# Patient Record
Sex: MALE | Race: WHITE | HISPANIC OR LATINO | Employment: FULL TIME | URBAN - METROPOLITAN AREA
[De-identification: names, ages, dates, MRNs, and addresses within clinical notes are randomized per-mention and may not be internally consistent; named-entity substitution may affect disease eponyms.]

---

## 2017-02-05 ENCOUNTER — HOSPITAL ENCOUNTER (EMERGENCY)
Facility: CLINIC | Age: 52
Discharge: HOME | End: 2017-02-05
Attending: EMERGENCY MEDICINE | Admitting: EMERGENCY MEDICINE

## 2017-02-05 VITALS
RESPIRATION RATE: 18 BRPM | SYSTOLIC BLOOD PRESSURE: 104 MMHG | DIASTOLIC BLOOD PRESSURE: 70 MMHG | WEIGHT: 206 LBS | HEART RATE: 142 BPM | TEMPERATURE: 98.2 F | OXYGEN SATURATION: 98 %

## 2017-02-05 DIAGNOSIS — I48.0 PAROXYSMAL ATRIAL FIBRILLATION (H): ICD-10-CM

## 2017-02-05 LAB
ANION GAP SERPL CALCULATED.3IONS-SCNC: 7 MMOL/L (ref 3–14)
BASOPHILS # BLD AUTO: 0.1 10E9/L (ref 0–0.2)
BASOPHILS NFR BLD AUTO: 0.6 %
BUN SERPL-MCNC: 15 MG/DL (ref 7–30)
CALCIUM SERPL-MCNC: 8.9 MG/DL (ref 8.5–10.1)
CHLORIDE SERPL-SCNC: 107 MMOL/L (ref 94–109)
CO2 SERPL-SCNC: 26 MMOL/L (ref 20–32)
CREAT SERPL-MCNC: 0.83 MG/DL (ref 0.66–1.25)
D DIMER PPP FEU-MCNC: 0.3 UG/ML FEU (ref 0–0.5)
DIFFERENTIAL METHOD BLD: NORMAL
EOSINOPHIL # BLD AUTO: 0.3 10E9/L (ref 0–0.7)
EOSINOPHIL NFR BLD AUTO: 3.7 %
ERYTHROCYTE [DISTWIDTH] IN BLOOD BY AUTOMATED COUNT: 12.4 % (ref 10–15)
GFR SERPL CREATININE-BSD FRML MDRD: ABNORMAL ML/MIN/1.7M2
GLUCOSE SERPL-MCNC: 147 MG/DL (ref 70–99)
HCT VFR BLD AUTO: 48.8 % (ref 40–53)
HGB BLD-MCNC: 16 G/DL (ref 13.3–17.7)
IMM GRANULOCYTES # BLD: 0 10E9/L (ref 0–0.4)
IMM GRANULOCYTES NFR BLD: 0.2 %
INTERPRETATION ECG - MUSE: NORMAL
INTERPRETATION ECG - MUSE: NORMAL
LYMPHOCYTES # BLD AUTO: 3.7 10E9/L (ref 0.8–5.3)
LYMPHOCYTES NFR BLD AUTO: 42.3 %
MCH RBC QN AUTO: 29.6 PG (ref 26.5–33)
MCHC RBC AUTO-ENTMCNC: 32.8 G/DL (ref 31.5–36.5)
MCV RBC AUTO: 90 FL (ref 78–100)
MONOCYTES # BLD AUTO: 0.8 10E9/L (ref 0–1.3)
MONOCYTES NFR BLD AUTO: 9.1 %
NEUTROPHILS # BLD AUTO: 3.9 10E9/L (ref 1.6–8.3)
NEUTROPHILS NFR BLD AUTO: 44.1 %
NRBC # BLD AUTO: 0 10*3/UL
NRBC BLD AUTO-RTO: 0 /100
NT-PROBNP SERPL-MCNC: 101 PG/ML (ref 0–900)
PLATELET # BLD AUTO: 274 10E9/L (ref 150–450)
POTASSIUM SERPL-SCNC: 3.8 MMOL/L (ref 3.4–5.3)
RBC # BLD AUTO: 5.41 10E12/L (ref 4.4–5.9)
SODIUM SERPL-SCNC: 140 MMOL/L (ref 133–144)
T4 FREE SERPL-MCNC: 0.88 NG/DL (ref 0.76–1.46)
TROPONIN I SERPL-MCNC: 0.02 UG/L (ref 0–0.04)
TSH SERPL DL<=0.005 MIU/L-ACNC: 4.3 MU/L (ref 0.4–4)
WBC # BLD AUTO: 8.8 10E9/L (ref 4–11)

## 2017-02-05 PROCEDURE — 80048 BASIC METABOLIC PNL TOTAL CA: CPT | Performed by: EMERGENCY MEDICINE

## 2017-02-05 PROCEDURE — 40000275 ZZH STATISTIC RCP TIME EA 10 MIN

## 2017-02-05 PROCEDURE — 25000125 ZZHC RX 250: Performed by: EMERGENCY MEDICINE

## 2017-02-05 PROCEDURE — 84443 ASSAY THYROID STIM HORMONE: CPT | Performed by: EMERGENCY MEDICINE

## 2017-02-05 PROCEDURE — 85379 FIBRIN DEGRADATION QUANT: CPT | Performed by: EMERGENCY MEDICINE

## 2017-02-05 PROCEDURE — 92960 CARDIOVERSION ELECTRIC EXT: CPT

## 2017-02-05 PROCEDURE — 84484 ASSAY OF TROPONIN QUANT: CPT | Performed by: EMERGENCY MEDICINE

## 2017-02-05 PROCEDURE — 99285 EMERGENCY DEPT VISIT HI MDM: CPT | Mod: 25

## 2017-02-05 PROCEDURE — 83880 ASSAY OF NATRIURETIC PEPTIDE: CPT | Performed by: EMERGENCY MEDICINE

## 2017-02-05 PROCEDURE — 93005 ELECTROCARDIOGRAM TRACING: CPT

## 2017-02-05 PROCEDURE — 85025 COMPLETE CBC W/AUTO DIFF WBC: CPT | Performed by: EMERGENCY MEDICINE

## 2017-02-05 PROCEDURE — 84439 ASSAY OF FREE THYROXINE: CPT | Performed by: EMERGENCY MEDICINE

## 2017-02-05 RX ORDER — DILTIAZEM HYDROCHLORIDE 5 MG/ML
20 INJECTION INTRAVENOUS ONCE
Status: COMPLETED | OUTPATIENT
Start: 2017-02-05 | End: 2017-02-05

## 2017-02-05 RX ORDER — LIDOCAINE 40 MG/G
CREAM TOPICAL
Status: DISCONTINUED | OUTPATIENT
Start: 2017-02-05 | End: 2017-02-05 | Stop reason: HOSPADM

## 2017-02-05 RX ORDER — PROPOFOL 10 MG/ML
200 INJECTION, EMULSION INTRAVENOUS ONCE
Status: COMPLETED | OUTPATIENT
Start: 2017-02-05 | End: 2017-02-05

## 2017-02-05 RX ADMIN — PROPOFOL 80 MG: 10 INJECTION, EMULSION INTRAVENOUS at 05:23

## 2017-02-05 RX ADMIN — DILTIAZEM HYDROCHLORIDE 20 MG: 5 INJECTION INTRAVENOUS at 03:46

## 2017-02-05 ASSESSMENT — ENCOUNTER SYMPTOMS
PALPITATIONS: 1
VOMITING: 0
SHORTNESS OF BREATH: 1
FEVER: 0
DIARRHEA: 0

## 2017-02-05 NOTE — ED NOTES
Pt awoke 30 minutes prior and felt a discomfort in his chest as well a sensation of his heart beating rapidly.

## 2017-02-05 NOTE — ED PROVIDER NOTES
History     Chief Complaint:  Palpitations    HPI   Jordan Warner is a 51 year old male who presents with his wife, who is acting as   He was feeling well when he went to bed, he woke up suddenly with rapid palpitations. He got up, drank some milk and sat in the couch because he was also feeling light-headed. He denies chest pain but complains of mild difficulty breathing. His wife reports that he had a similar situation happened in December but he didn't consult at the time. They deny alcohol or drug use. Denies vomiting, diarrhea, or fever.     Allergies:  No Known Allergies     Medications:    The patient is not currently taking any prescribed medications.    Past Medical History:    History reviewed. No pertinent past medical history.    Past Surgical History:    History reviewed. No pertinent past surgical history.    Family History:    No family history on file.    Social History:  Marital Status:       Review of Systems   Constitutional: Negative for fever.   Respiratory: Positive for shortness of breath.    Cardiovascular: Positive for palpitations. Negative for chest pain.   Gastrointestinal: Negative for vomiting and diarrhea.   All other systems reviewed and are negative.      Physical Exam     Patient Vitals for the past 24 hrs:   BP Temp Temp src Pulse Heart Rate Resp SpO2 Weight   02/05/17 0545 104/70 mmHg - - - 47 - 98 % -   02/05/17 0530 109/73 mmHg - - - 49 - 97 % -   02/05/17 0515 - - - - 49 - 97 % -   02/05/17 0500 134/86 mmHg - - - 81 - 99 % -   02/05/17 0445 133/86 mmHg - - - 93 - 99 % -   02/05/17 0415 101/73 mmHg - - - 68 - 96 % -   02/05/17 0400 109/80 mmHg - - - 84 - 95 % -   02/05/17 0345 (!) 134/115 mmHg - - - 112 - 96 % -   02/05/17 0330 (!) 135/98 mmHg - - - 140 - 96 % -   02/05/17 0320 - - - - 117 - 97 % -   02/05/17 0315 (!) 127/93 mmHg - - - - - 96 % -   02/05/17 0314 (!) 127/93 mmHg 98.2  F (36.8  C) Oral 142 142 18 92 % 93.441 kg (206 lb)       Physical Exam  Nursing note  and vitals reviewed.  Constitutional: Cooperative.   HENT:   Mouth/Throat: Mucous membranes are normal.   Cardiovascular: Tachycardic, irregularly irregular rhythm. Normal heart sounds.  No murmur.  Pulmonary/Chest: Effort normal and breath sounds normal. No respiratory distress. No wheezes. No rales.   Abdominal: Soft. Normal appearance. There is no tenderness. There is no rigidity and no guarding.   Musculoskeletal: No leg edema.  Neurological: Alert. Oriented x4.  Skin: Skin is warm and dry. No rash noted.   Psychiatric: Normal mood and affect.     Emergency Department Course     ECG (03:13:54):  Indication: palpitations  Rate 126 bpm. NH interval - ms. QRS duration 88 ms. QT/QTc 320/463 ms. R axis -16.   Interpretation: AFib with RVR. Nonspecific ST abnormality.  Agree with computer interpretation.  Interpreted at 0315 by Dr. Landry.     ECG (04:11:45):  Indication: post diltiazem   Rate 82 bpm. NH interval - ms. QRS duration 94 ms. QT/QTc 356/415 ms. R axis -9.   Interpretation: AFib.  Agree with computer interpretation.    Interpreted at 0438 by Dr. Landry.     ECG (05:16:54):  Indication: post-electrical cardioversion.   Rate 52 bpm. NH interval 148 ms. QRS duration 92 ms. QT/QTc 408/379 ms. R axis -11.   Interpretation: sinus bradycardia.  Agree with computer interpretation.   Interpreted at 0524 by Dr. Landry.     Laboratory:  Laboratory findings were communicated with the patient who voiced understanding of the findings.  CBC: WBC 8.8, HGB 16.0, , WNL  BMP:  (Creatinine 0.83)    D-dimer: <0.3    Troponin I: 0.023  BNP: 101    TSH: 4.30  T4 free: 0.88    Procedures:  Electrical Cardioversion:  Indication: Atrial fibrillation with RVR  Risks, benefits and alternatives were discussed with patient and consent for procedure was obtained.    Otho protocol was followed. TIME OUT conducted just prior to starting procedure confirmed patient identity, site/side, procedure, patient position, and  availability of correct equipment.     Procedural sedation: propofol 80 mg  Quick combo electrodes were placed.  Trial 1: Synchronized shock at 120 Joules was unsuccessful.   Trial 2: Synchronized shock at 200 Joules was successful.     Patient Status:  Patient tolerated the procedure well. There were no complications).    Interventions:  0346: Diltiazem 20 mg, IV  0523: Propofol 80 mg, IV     Emergency Department Course:  Nursing notes and vitals reviewed.  I performed an exam of the patient as documented above.   The patient was placed on continuous pulse oximetry and cardiac monitoring.  A peripheral IV was established.  IV was inserted and blood was drawn for laboratory testing, results above.    I discussed the findings and treatment plan with the patient. They expressed understanding of this plan and consented to discharge. They will be discharged home with instructions for care and follow up. In addition, the patient will return to the emergency department if their symptoms persist, worsen, if new symptoms arise or if there is any concern.  All questions were answered.   I personally reviewed the findings with the patient and answered all related questions prior to discharge.    Impression & Plan      Medical Decision Making:  Jordan Warner is a 51 year old gentleman who presents with palpitations. He is found to be in new onset Atrial Fibrillation with RVR. He was rate controlled successfully using diltiazem but never converted. He has a very clear timing of onset as he came in essentially immediately after feeling the palpitations. He was safe for cardioversion and was cardioverted on the 2nd attempt without difficulty. His thyroid is reassuring as is his troponin, D-Dimer is negative making PE very unlikely in this otherwise healthy gentleman. He has clear lungs and no concern for pericarditis, myocarditis or pneumothorax. I will have him follow-up with cardiology this week to discuss further workup and  possible medications to use if his symptoms recur. Return precautions discussed should he develop any more palpitations in the meantime.     Diagnosis:    ICD-10-CM   1. Paroxysmal atrial fibrillation (H) I48.0     Disposition:   Discharge home    Discharge Medications:  There are no discharge medications for this patient.      Scribe Disclosure:  Rebecca IVY, am serving as a scribe at 3:24 AM on 2/5/2017 to document services personally performed by Eliseo Landry MD, based on my observations and the provider's statements to me.        Eliseo Landry MD  02/05/17 2268

## 2017-02-05 NOTE — ED AVS SNAPSHOT
[facility]    [address] [place] [address]    Phone:  [phone]    Fax:  [phone]                                       Jordan Warner   MRN: [id number]    Department:  [facility]   Date of Visit:  2/5/2017           Patient Information     Date Of Birth          1965        Your diagnoses for this visit were:     Paroxysmal atrial fibrillation (H)        You were seen by Eliseo Landry MD.      Follow-up Information     Follow up with Cardiology this week.      Discharge References/Attachments     FIBRILLATION, ATRIAL (ENGLISH)      24 Hour Appointment Hotline       To make an appointment at any [place] clinic, call [phone] ([phone]). If you don't have a family doctor or clinic, we will help you find one. [place] clinics are conveniently located to serve the needs of you and your family.             Review of your medicines      Notice     You have not been prescribed any medications.            Procedures and tests performed during your visit     Procedure/Test Number of Times Performed    Basic metabolic panel 1    CBC with platelets differential 1    Cardiac Continuous Monitoring 1    D dimer quantitative 1    EKG 12 lead 4    Nt probnp inpatient (BNP) 1    Peripheral IV: Standard 1    Pulse oximetry nursing 1    T4 free 1    TSH with free T4 reflex 1    Troponin I 1      Orders Needing Specimen Collection     None      Pending Results     No orders found from 2/4/2017 to 2/6/2017.            Pending Culture Results     No orders found from 2/4/2017 to 2/6/2017.       Test Results from your hospital stay           2/5/2017  4:06 AM - Interface, Podio Results      Component Results     Component Value Ref Range & Units Status    WBC 8.8 4.0 - 11.0 10e9/L Final    RBC Count 5.41 4.4 - 5.9 10e12/L Final    Hemoglobin 16.0 13.3 - 17.7 g/dL Final    Hematocrit 48.8 40.0 - 53.0 % Final    MCV 90 78 -  100 fl Final    MCH 29.6 26.5 - 33.0 pg Final    MCHC 32.8 31.5 - 36.5 g/dL Final    RDW 12.4 10.0 - 15.0 % Final    Platelet Count 274 150 - 450 10e9/L Final    Diff Method Automated Method  Final    % Neutrophils 44.1 % Final    % Lymphocytes 42.3 % Final    % Monocytes 9.1 % Final    % Eosinophils 3.7 % Final    % Basophils 0.6 % Final    % Immature Granulocytes 0.2 % Final    Nucleated RBCs 0 0 /100 Final    Absolute Neutrophil 3.9 1.6 - 8.3 10e9/L Final    Absolute Lymphocytes 3.7 0.8 - 5.3 10e9/L Final    Absolute Monocytes 0.8 0.0 - 1.3 10e9/L Final    Absolute Eosinophils 0.3 0.0 - 0.7 10e9/L Final    Absolute Basophils 0.1 0.0 - 0.2 10e9/L Final    Abs Immature Granulocytes 0.0 0 - 0.4 10e9/L Final    Absolute Nucleated RBC 0.0  Final         2/5/2017  3:48 AM - Interface, Flexilab Results      Component Results     Component Value Ref Range & Units Status    D Dimer 0.3 0.0 - 0.50 ug/ml FEU Final    This D-dimer assay is intended for use in conjuntion with a clinical pretest   probability assessment model to exclude pulmonary embolism (PE) and as an aid   in the diagnosis of deep venous thrombosis (DVT) in outpatients suspected of   PE   or DVT. The cut-off value is 0.5 g/mL FEU.           2/5/2017  3:55 AM - Interface, Flexilab Results      Component Results     Component Value Ref Range & Units Status    Sodium 140 133 - 144 mmol/L Final    Potassium 3.8 3.4 - 5.3 mmol/L Final    Chloride 107 94 - 109 mmol/L Final    Carbon Dioxide 26 20 - 32 mmol/L Final    Anion Gap 7 3 - 14 mmol/L Final    Glucose 147 (H) 70 - 99 mg/dL Final    Urea Nitrogen 15 7 - 30 mg/dL Final    Creatinine 0.83 0.66 - 1.25 mg/dL Final    GFR Estimate >90  Non  GFR Calc   >60 mL/min/1.7m2 Final    GFR Estimate If Black >90   GFR Calc   >60 mL/min/1.7m2 Final    Calcium 8.9 8.5 - 10.1 mg/dL Final         2/5/2017  3:55 AM - Interface, Flexilab Results      Component Results     Component Value Ref  Range & Units Status    Troponin I ES 0.023 0.000 - 0.045 ug/L Final    The 99th percentile for upper reference range is 0.045 ug/L.  Troponin values   in   the range of 0.045 - 0.120 ug/L may be associated with risks of adverse   clinical events.           2/5/2017  3:55 AM - Interface, Flexilab Results      Component Results     Component Value Ref Range & Units Status    N-Terminal Pro BNP Inpatient 101 0 - 900 pg/mL Final    Reference range shown and results flagged as abnormal are suggested inpatient   cut points for confirming diagnosis if CHF in an acute setting. Establishing   a   baseline value for each individual patient is useful for follow-up. An   inpatient or emergency department NT-proPBNP <300 pg/mL effectively rules out   acute CHF, with 99% negative predictive value.  The outpatient non-acute reference range for ruling out CHF is:   0-125 pg/mL (age 18 to less than 75)   0-450 pg/mL (age 75 yrs and older)           2/5/2017  3:59 AM - Interface, Flexilab Results      Component Results     Component Value Ref Range & Units Status    TSH 4.30 (H) 0.40 - 4.00 mU/L Final         2/5/2017  4:13 AM - Interface, Flexilab Results      Component Results     Component Value Ref Range & Units Status    T4 Free 0.88 0.76 - 1.46 ng/dL Final                Clinical Quality Measure: Blood Pressure Screening     Your blood pressure was checked while you were in the emergency department today. The last reading we obtained was  BP: (!) 127/93 mmHg (Simultaneous filing. User may not have seen previous data.) . Please read the guidelines below about what these numbers mean and what you should do about them.  If your systolic blood pressure (the top number) is less than 120 and your diastolic blood pressure (the bottom number) is less than 80, then your blood pressure is normal. There is nothing more that you need to do about it.  If your systolic blood pressure (the top number) is 120-139 or your diastolic blood  "pressure (the bottom number) is 80-89, your blood pressure may be higher than it should be. You should have your blood pressure rechecked within a year by a primary care provider.  If your systolic blood pressure (the top number) is 140 or greater or your diastolic blood pressure (the bottom number) is 90 or greater, you may have high blood pressure. High blood pressure is treatable, but if left untreated over time it can put you at risk for heart attack, stroke, or kidney failure. You should have your blood pressure rechecked by a primary care provider within the next 4 weeks.  If your provider in the emergency department today gave you specific instructions to follow-up with your doctor or provider even sooner than that, you should follow that instruction and not wait for up to 4 weeks for your follow-up visit.        Thank you for choosing [facility]       Thank you for choosing [facility] for your care. Our goal is always to provide you with excellent care. Hearing back from our patients is one way we can continue to improve our services. Please take a few minutes to complete the written survey that you may receive in the mail after you visit with us. Thank you!        [employer] Information     [employer] lets you send messages to your doctor, view your test results, renew your prescriptions, schedule appointments and more. To sign up, go to [email or web address] . Click on \"Log in\" on the left side of the screen, which will take you to the Welcome page. Then click on \"Sign up Now\" on the right side of the page.     You will be asked to enter the access code listed below, as well as some personal information. Please follow the directions to create your username and password.     Your access code is: [email or web address]  Expires: 2017  5:46 AM     Your access code will  in 90 days. If you need help or a new code, please call your [facility] clinic or [phone].        Care EveryWhere ID     This is your Care " EveryWhere ID. This could be used by other organizations to access your [facility] medical records  [id number]        After Visit Summary       This is your record. Keep this with you and show to your community pharmacist(s) and doctor(s) at your next visit.

## 2017-02-06 LAB — INTERPRETATION ECG - MUSE: NORMAL

## 2017-02-13 ENCOUNTER — HOSPITAL ENCOUNTER (EMERGENCY)
Facility: CLINIC | Age: 52
Discharge: ANOTHER HOSPITAL | End: 2017-02-13
Attending: EMERGENCY MEDICINE | Admitting: EMERGENCY MEDICINE

## 2017-02-13 ENCOUNTER — HOSPITAL ENCOUNTER (OUTPATIENT)
Facility: CLINIC | Age: 52
Setting detail: OBSERVATION
Discharge: HOME | End: 2017-02-14
Attending: INTERNAL MEDICINE | Admitting: INTERNAL MEDICINE

## 2017-02-13 VITALS
DIASTOLIC BLOOD PRESSURE: 74 MMHG | OXYGEN SATURATION: 96 % | RESPIRATION RATE: 18 BRPM | HEART RATE: 157 BPM | SYSTOLIC BLOOD PRESSURE: 127 MMHG | TEMPERATURE: 98.2 F

## 2017-02-13 DIAGNOSIS — I48.91 ATRIAL FIBRILLATION WITH RVR (H): ICD-10-CM

## 2017-02-13 DIAGNOSIS — I48.91 ATRIAL FIBRILLATION WITH RAPID VENTRICULAR RESPONSE (H): Primary | ICD-10-CM

## 2017-02-13 LAB
ALBUMIN SERPL-MCNC: 3.8 G/DL (ref 3.4–5)
ALP SERPL-CCNC: 87 U/L (ref 40–150)
ALT SERPL W P-5'-P-CCNC: 43 U/L (ref 0–70)
ANION GAP SERPL CALCULATED.3IONS-SCNC: 9 MMOL/L (ref 3–14)
AST SERPL W P-5'-P-CCNC: 25 U/L (ref 0–45)
BASOPHILS # BLD AUTO: 0 10E9/L (ref 0–0.2)
BASOPHILS NFR BLD AUTO: 0.5 %
BILIRUB SERPL-MCNC: 0.8 MG/DL (ref 0.2–1.3)
BUN SERPL-MCNC: 13 MG/DL (ref 7–30)
CALCIUM SERPL-MCNC: 8.7 MG/DL (ref 8.5–10.1)
CHLORIDE SERPL-SCNC: 107 MMOL/L (ref 94–109)
CO2 SERPL-SCNC: 24 MMOL/L (ref 20–32)
CREAT SERPL-MCNC: 0.85 MG/DL (ref 0.66–1.25)
DIFFERENTIAL METHOD BLD: NORMAL
EOSINOPHIL # BLD AUTO: 0.2 10E9/L (ref 0–0.7)
EOSINOPHIL NFR BLD AUTO: 2.7 %
ERYTHROCYTE [DISTWIDTH] IN BLOOD BY AUTOMATED COUNT: 12.3 % (ref 10–15)
GFR SERPL CREATININE-BSD FRML MDRD: ABNORMAL ML/MIN/1.7M2
GLUCOSE SERPL-MCNC: 134 MG/DL (ref 70–99)
HCT VFR BLD AUTO: 48.1 % (ref 40–53)
HGB BLD-MCNC: 16 G/DL (ref 13.3–17.7)
IMM GRANULOCYTES # BLD: 0 10E9/L (ref 0–0.4)
IMM GRANULOCYTES NFR BLD: 0.2 %
INTERPRETATION ECG - MUSE: NORMAL
INTERPRETATION ECG - MUSE: NORMAL
LYMPHOCYTES # BLD AUTO: 3.1 10E9/L (ref 0.8–5.3)
LYMPHOCYTES NFR BLD AUTO: 37 %
MAGNESIUM SERPL-MCNC: 2.1 MG/DL (ref 1.6–2.3)
MCH RBC QN AUTO: 29.4 PG (ref 26.5–33)
MCHC RBC AUTO-ENTMCNC: 33.3 G/DL (ref 31.5–36.5)
MCV RBC AUTO: 88 FL (ref 78–100)
MONOCYTES # BLD AUTO: 0.6 10E9/L (ref 0–1.3)
MONOCYTES NFR BLD AUTO: 7.6 %
NEUTROPHILS # BLD AUTO: 4.4 10E9/L (ref 1.6–8.3)
NEUTROPHILS NFR BLD AUTO: 52 %
NRBC # BLD AUTO: 0 10*3/UL
NRBC BLD AUTO-RTO: 0 /100
PLATELET # BLD AUTO: 235 10E9/L (ref 150–450)
POTASSIUM SERPL-SCNC: 3.9 MMOL/L (ref 3.4–5.3)
PROT SERPL-MCNC: 7.6 G/DL (ref 6.8–8.8)
RBC # BLD AUTO: 5.45 10E12/L (ref 4.4–5.9)
SODIUM SERPL-SCNC: 140 MMOL/L (ref 133–144)
TROPONIN I SERPL-MCNC: 0.02 UG/L (ref 0–0.04)
TSH SERPL DL<=0.05 MIU/L-ACNC: 3.14 MU/L (ref 0.4–4)
WBC # BLD AUTO: 8.4 10E9/L (ref 4–11)

## 2017-02-13 PROCEDURE — 96376 TX/PRO/DX INJ SAME DRUG ADON: CPT

## 2017-02-13 PROCEDURE — 85025 COMPLETE CBC W/AUTO DIFF WBC: CPT | Performed by: EMERGENCY MEDICINE

## 2017-02-13 PROCEDURE — 84484 ASSAY OF TROPONIN QUANT: CPT | Performed by: PHYSICIAN ASSISTANT

## 2017-02-13 PROCEDURE — 25000125 ZZHC RX 250: Performed by: EMERGENCY MEDICINE

## 2017-02-13 PROCEDURE — 25000132 ZZH RX MED GY IP 250 OP 250 PS 637: Performed by: EMERGENCY MEDICINE

## 2017-02-13 PROCEDURE — 25000132 ZZH RX MED GY IP 250 OP 250 PS 637: Performed by: PHYSICIAN ASSISTANT

## 2017-02-13 PROCEDURE — 80053 COMPREHEN METABOLIC PANEL: CPT | Performed by: EMERGENCY MEDICINE

## 2017-02-13 PROCEDURE — 99285 EMERGENCY DEPT VISIT HI MDM: CPT | Mod: 25

## 2017-02-13 PROCEDURE — 99204 OFFICE O/P NEW MOD 45 MIN: CPT | Performed by: INTERNAL MEDICINE

## 2017-02-13 PROCEDURE — 99220 ZZC INITIAL OBSERVATION CARE,LEVL III: CPT | Performed by: PHYSICIAN ASSISTANT

## 2017-02-13 PROCEDURE — 93005 ELECTROCARDIOGRAM TRACING: CPT

## 2017-02-13 PROCEDURE — 84443 ASSAY THYROID STIM HORMONE: CPT | Performed by: EMERGENCY MEDICINE

## 2017-02-13 PROCEDURE — 84484 ASSAY OF TROPONIN QUANT: CPT | Performed by: EMERGENCY MEDICINE

## 2017-02-13 PROCEDURE — 96374 THER/PROPH/DIAG INJ IV PUSH: CPT

## 2017-02-13 PROCEDURE — 83735 ASSAY OF MAGNESIUM: CPT | Performed by: PHYSICIAN ASSISTANT

## 2017-02-13 PROCEDURE — 96361 HYDRATE IV INFUSION ADD-ON: CPT

## 2017-02-13 PROCEDURE — 25000128 H RX IP 250 OP 636: Performed by: EMERGENCY MEDICINE

## 2017-02-13 PROCEDURE — 36415 COLL VENOUS BLD VENIPUNCTURE: CPT | Performed by: PHYSICIAN ASSISTANT

## 2017-02-13 PROCEDURE — G0378 HOSPITAL OBSERVATION PER HR: HCPCS

## 2017-02-13 RX ORDER — DILTIAZEM HYDROCHLORIDE 5 MG/ML
25 INJECTION INTRAVENOUS ONCE
Status: COMPLETED | OUTPATIENT
Start: 2017-02-13 | End: 2017-02-13

## 2017-02-13 RX ORDER — NALOXONE HYDROCHLORIDE 0.4 MG/ML
.1-.4 INJECTION, SOLUTION INTRAMUSCULAR; INTRAVENOUS; SUBCUTANEOUS
Status: DISCONTINUED | OUTPATIENT
Start: 2017-02-13 | End: 2017-02-14 | Stop reason: HOSPADM

## 2017-02-13 RX ORDER — AMOXICILLIN 250 MG
1-2 CAPSULE ORAL 2 TIMES DAILY
Status: DISCONTINUED | OUTPATIENT
Start: 2017-02-13 | End: 2017-02-14 | Stop reason: HOSPADM

## 2017-02-13 RX ORDER — ONDANSETRON 2 MG/ML
4 INJECTION INTRAMUSCULAR; INTRAVENOUS ONCE
Status: DISCONTINUED | OUTPATIENT
Start: 2017-02-13 | End: 2017-02-13 | Stop reason: HOSPADM

## 2017-02-13 RX ORDER — ASPIRIN 81 MG/1
324 TABLET, CHEWABLE ORAL ONCE
Status: COMPLETED | OUTPATIENT
Start: 2017-02-13 | End: 2017-02-13

## 2017-02-13 RX ORDER — ONDANSETRON 2 MG/ML
4 INJECTION INTRAMUSCULAR; INTRAVENOUS EVERY 6 HOURS PRN
Status: DISCONTINUED | OUTPATIENT
Start: 2017-02-13 | End: 2017-02-14 | Stop reason: HOSPADM

## 2017-02-13 RX ORDER — ASPIRIN 81 MG/1
81 TABLET ORAL DAILY
Status: DISCONTINUED | OUTPATIENT
Start: 2017-02-14 | End: 2017-02-14 | Stop reason: HOSPADM

## 2017-02-13 RX ORDER — ONDANSETRON 4 MG/1
4 TABLET, ORALLY DISINTEGRATING ORAL EVERY 6 HOURS PRN
Status: DISCONTINUED | OUTPATIENT
Start: 2017-02-13 | End: 2017-02-14 | Stop reason: HOSPADM

## 2017-02-13 RX ORDER — METOPROLOL SUCCINATE 25 MG/1
25 TABLET, EXTENDED RELEASE ORAL DAILY
Status: DISCONTINUED | OUTPATIENT
Start: 2017-02-14 | End: 2017-02-14 | Stop reason: HOSPADM

## 2017-02-13 RX ORDER — LIDOCAINE 40 MG/G
CREAM TOPICAL
Status: DISCONTINUED | OUTPATIENT
Start: 2017-02-13 | End: 2017-02-13 | Stop reason: HOSPADM

## 2017-02-13 RX ORDER — BISACODYL 10 MG
10 SUPPOSITORY, RECTAL RECTAL DAILY PRN
Status: DISCONTINUED | OUTPATIENT
Start: 2017-02-13 | End: 2017-02-14 | Stop reason: HOSPADM

## 2017-02-13 RX ORDER — SODIUM CHLORIDE 9 MG/ML
1000 INJECTION, SOLUTION INTRAVENOUS CONTINUOUS
Status: DISCONTINUED | OUTPATIENT
Start: 2017-02-13 | End: 2017-02-13 | Stop reason: HOSPADM

## 2017-02-13 RX ORDER — ONDANSETRON 2 MG/ML
INJECTION INTRAMUSCULAR; INTRAVENOUS
Status: DISCONTINUED
Start: 2017-02-13 | End: 2017-02-13 | Stop reason: HOSPADM

## 2017-02-13 RX ORDER — ATORVASTATIN CALCIUM 10 MG/1
10 TABLET, FILM COATED ORAL DAILY
Status: DISCONTINUED | OUTPATIENT
Start: 2017-02-14 | End: 2017-02-14 | Stop reason: HOSPADM

## 2017-02-13 RX ORDER — AMOXICILLIN 250 MG
1-2 CAPSULE ORAL 2 TIMES DAILY PRN
Status: DISCONTINUED | OUTPATIENT
Start: 2017-02-13 | End: 2017-02-14 | Stop reason: HOSPADM

## 2017-02-13 RX ORDER — ACETAMINOPHEN 325 MG/1
650 TABLET ORAL EVERY 4 HOURS PRN
Status: DISCONTINUED | OUTPATIENT
Start: 2017-02-13 | End: 2017-02-14 | Stop reason: HOSPADM

## 2017-02-13 RX ORDER — DILTIAZEM HYDROCHLORIDE 5 MG/ML
20 INJECTION INTRAVENOUS ONCE
Status: COMPLETED | OUTPATIENT
Start: 2017-02-13 | End: 2017-02-13

## 2017-02-13 RX ORDER — POLYETHYLENE GLYCOL 3350 17 G/17G
17 POWDER, FOR SOLUTION ORAL DAILY PRN
Status: DISCONTINUED | OUTPATIENT
Start: 2017-02-13 | End: 2017-02-14 | Stop reason: HOSPADM

## 2017-02-13 RX ADMIN — SODIUM CHLORIDE 1000 ML: 9 INJECTION, SOLUTION INTRAVENOUS at 07:45

## 2017-02-13 RX ADMIN — DILTIAZEM HYDROCHLORIDE 20 MG: 5 INJECTION INTRAVENOUS at 08:17

## 2017-02-13 RX ADMIN — ASPIRIN 81 MG 324 MG: 81 TABLET ORAL at 09:30

## 2017-02-13 RX ADMIN — DILTIAZEM HYDROCHLORIDE 20 MG: 5 INJECTION INTRAVENOUS at 07:46

## 2017-02-13 RX ADMIN — DILTIAZEM HYDROCHLORIDE 10 MG/HR: 5 INJECTION INTRAVENOUS at 09:30

## 2017-02-13 RX ADMIN — SENNOSIDES AND DOCUSATE SODIUM 1 TABLET: 8.6; 5 TABLET ORAL at 14:37

## 2017-02-13 ASSESSMENT — ENCOUNTER SYMPTOMS
CHILLS: 0
FEVER: 0
DIZZINESS: 1
PALPITATIONS: 1

## 2017-02-13 NOTE — CONSULTS
REASON FOR CONSULTATION:  I have been asked to see Jordan Warner in cardiology consultation because of his presentation to [facility] with recurrent atrial fibrillation with rapid ventricular response.      HISTORY OF PRESENT ILLNESS:  The patient is a 51-year-old male who was primarily in his normal state of health until the past 10 days to 2 weeks, when he has been noticing isolated episodes of frequent palpitations.  He was evaluated for having rapid palpitations with lightheadedness but no chest pain but some shortness of breath approximately a week before this consultation, when he presented to the emergency room and initial medical treatment was unsuccessful in converting the rhythm, and he underwent successful electrical cardioversion after 1 unsuccessful shock with 100 joules and a successful shock with 200 joules.  He remained in sinus rhythm and was discharged out of the hospital for Cardiology followup as an outpatient.  It should be noted that the patient when he presented did not have significant electrocardiographic changes of ischemia and also had negative troponins and no evidence of pulmonary emboli.  The patient speaks primarily Bermudian and his wife helps as an .  His history is not totally complete but he apparently has had a tendency towards higher blood pressure, is prediabetic, and possibly has elevated serum cholesterols (no old records available).  He has no history of myocardial infarction, congestive heart failure, rheumatic heart disease, congenital heart disease or heart murmur.  He does not relate significant surgical history.  He has noticed the palpitations over the past month or two with increase in frequency and severity over the last 2 weeks.  He has been participating in a new job that has tremendous stress at work.  He states that he has noted the palpitations twice in the late evening or at night when he is most quiet.  The only documented blood  pressure being elevated is that when he presented to the emergency room with his atrial fibrillation with a rapid ventricular response and the nonspecific ST-T wave changes that have resolved when the rhythm is converted to sinus.  The patient gives no history of cigarette smoking, documented persistent hypertension, active diabetes mellitus.  There is a question of hyperlipidemia and no family history of premature coronary disease noted, although a sister may have also had palpitations in the past.      REVIEW OF SYSTEMS:  Unremarkable for severe headache, seizure, stroke, dysphagia, syncope, asthma, bronchitis, pneumonia, active peptic ulcer disease, bowel or bladder dysfunction.  He has not noted natty chest pain.  He does have the palpitations as already noted.  He is not noted to have significant swelling or weakness of the lower extremities.  He does not relate to significant allergies.  He has had no symptoms of PND, orthopnea, fevers, chills or sweats.  He has had no significant weight change, although he has gained approximately 100+  pounds over his 25 or so years of marriage.  The patient is resting comfortably at this time.  After receiving a dose of IV diltiazem, his rhythm spontaneously converted from atrial fibrillation with a rapid ventricular response to sinus rhythm.  He is not relating natty chest pain, nausea, vomiting, diaphoresis or syncope.  Cardiology consultation was requested because of the recurrent atrial fibrillation with a rapid ventricular response.  After obtaining further medical records he was placed as an outpatient on metoprolol-XL 25 mg a day, atorvastatin 10 mg a day, and aspirin 81 mg a day.      PHYSICAL EXAMINATION:   GENERAL:  The patient is a middle-aged, mildly overweight  male in no acute distress.   VITAL SIGNS:  Vital signs show a blood pressure of 123/71, with a heart rate of 50-60 and regular.   HEENT:  Pupils equal, round, react to light and accommodate.   Ear, nose and throat unremarkable.   NECK:  Without jugular venous distention, carotid bruit or palpable thyroid.   CHEST:  Essentially clear to percussion and auscultation with slight decreased breath sounds at the bases.   CARDIAC:  Revealed a regular rhythm.  There was a soft S1, physiologically split S2, soft S4 gallop.  There is a 1/6 systolic murmur at the left sternal border with minimal radiation.  No diastolic murmur, rub or S3.   ABDOMEN:  Soft, nontender, without palpable liver, spleen or masses.  Bowel sounds are present.   EXTREMITIES:  Without significant cyanosis or edema.  Pulses were 2+ throughout without bruit.   NEUROLOGIC:  Appeared to be grossly intact.      Initial electrocardiogram showed atrial fibrillation with rapid ventricular response, nonspecific ST-T wave changes.  Second electrocardiogram showed normal sinus rhythm with no significant ST abnormality.      LABORATORY DATA:  Demonstrated a sodium 140, potassium 3.9, chloride 107, CO2 of 24, BUN 13, creatinine 0.85, calcium 8.7, ALT is 87, AST 25.  Troponin was 0.020; 0.021, and 0.018.  TSH was 3.14.  Glucose was 134.  WBC is 8.4, hemoglobin 16.0, hematocrit 48.1, platelet count 235,000.      Chest x-ray not available for viewing.      CLINICAL IMPRESSION:   1.  Recurrent atrial fibrillation with rapid ventricular response, now resolved with sinus rhythm/bradycardia.   2.  History of palpitations and rapid heartbeat symptoms associated with #1.   3.  Hyperglycemia ? prediabetes.   4.  History of hyperlipidemia.   5.  ? borderline hypertension in the past.   6.  Overweight.      DISCUSSION:  The patient presents with recurrent atrial fibrillation with a rapid ventricular response.  His first episode required DC cardioversion to obtain a regular rhythm, the second episode simply a dose of IV diltiazem, possibly on top of the p.o. metoprolol that he has already taken.  The patient has some evidence for an elevated CHADS-VASc, however, his  possible hypertension is just being evaluated and he has not been labeled as having actual diabetes by a hemoglobin A1c.  He has been started on aspirin.  He does not have 1 outward risk factor with a tendency towards hypertension, tendency towards hyperglycemia/prediabetes.  This would be worth pursuing.  An echocardiogram has been ordered to check left ventricular function as well as valvular function and atrial size and Cardiolite stress test will be performed to rule out possible underlying ischemic heart disease.  The patient will need close followup of his serum lipids, basic metabolic panel, and blood pressure.  Otherwise, he appears to be resting comfortably with no evidence of significant myocardial insult.      RECOMMENDATIONS:   1.  Telemetry.   2.  Check serial EKGs and cardiac enzymes.   3.  Echocardiogram to evaluate left ventricular function and valvular function as well as left and right atrial size.   4.  Recheck hemoglobin A1c and serum lipids.   5.  Education regarding atrial fibrillation and possible exercise, avoiding caffeine, sodium and stress as much as possible.   6.  Routine medical followup.   7.  Will consider the possibility of anticoagulation after further information obtained and test results examined.         MAGGI MORENO MD, [facility]             D: 2017 15:01   T: 2017 16:35   MT: CHIOMA      Name:     AN TREJO   MRN:      [id number]        Account:       [id number]   :      1965           Consult Date:  2017      Document: [id number]       cc: [facility]

## 2017-02-13 NOTE — IP AVS SNAPSHOT
[facility]    [address] [place], [place] [address]., [address]    [place] [address]    Phone:  [phone]                                       After Visit Summary   2/13/2017    Jordan Warner    MRN: [id number]           After Visit Summary Signature Page     I have received my discharge instructions, and my questions have been answered. I have discussed any challenges I see with this plan with the nurse or doctor.    ..........................................................................................................................................  Patient/Patient Representative Signature      ..........................................................................................................................................  Patient Representative Print Name and Relationship to Patient    ..................................................               ................................................  Date                                            Time    ..........................................................................................................................................  Reviewed by Signature/Title    ...................................................              ..............................................  Date                                                            Time

## 2017-02-13 NOTE — PLAN OF CARE
Problem: Goal Outcome Summary  Goal: Goal Outcome Summary  Outcome: No Change  Pt transferred from [facility] for Afib. Converted to SR prior to arrival. Tele currently SB/SR. Pt denies chest pain/SOB. Cardiology saw pt and ordered stress test and EP consult for tomorrow. Will continue to monitor pt.

## 2017-02-13 NOTE — IP AVS SNAPSHOT
MRN:[id number]                      After Visit Summary   2/13/2017    Jordan Warner    MRN: [id number]           Thank you!     Thank you for choosing [facility] for your care. Our goal is always to provide you with excellent care. Hearing back from our patients is one way we can continue to improve our services. Please take a few minutes to complete the written survey that you may receive in the mail after you visit with us. Thank you!        Patient Information     Date Of Birth          1965        About your hospital stay     You were admitted on:  February 13, 2017 You last received care in the:  [facility]    You were discharged on:  February 14, 2017        Reason for your hospital stay       You were hospitalized secondary to a rapid heart rate (atrial fibrillation).                  Who to Call     For medical emergencies, please call 911.  For non-urgent questions about your medical care, please call your primary care provider or clinic, None          Attending Provider     Provider Specialty    Agustin Bass MD Internal Medicine       Primary Care Provider Fax #    [facility] [phone]       [address] [place] [address] Attn: [facility] [id number]         When to contact your care team       Call your primary doctor if you have any of the following:  increased shortness of breath or increased pain.                  After Care Instructions     Activity       Your activity upon discharge: activity as tolerated            Diet       Follow this diet upon discharge: Orders Placed This Encounter      Room Service      Low Saturated Fat Na <2400 mg                  Follow-up Appointments     Follow-up and recommended labs and tests        You have an appt at [facility] on Tuesday, February 28 at 8 am.  [facility]  [address] [place] [address] [phone]                  Your next 10 appointments already scheduled     Feb 15, 2017   9:00 AM CST   [facility] Inpatient with [employer]    Services Department ([facility])    [address] [place] [id number]               Feb 16, 2017  9:00 AM CST   [facility] Inpatient with [employer]    Services Department ([facility])    [address] [place] [id number]               Feb 17, 2017  9:00 AM CST   [facility] Inpatient with [employer]    Services Department ([facility])    [address] [place] [phone]               Feb 20, 2017  3:10 PM CST   [facility] EP RETURN with Osiris Batista PA-C   [facility] [place] ([facility])    [address] [place] [id number] [phone]            Feb 21, 2017  9:30 AM CST   ecg with [facility] ([facility])    [address]  [facility] [address] [phone]            Mar 21, 2017  3:45 PM CDT   [facility] EP RETURN with Greg Stout MD   [facility] [place] ([facility])    [address] [place] [id number] [phone]              Future tests that were ordered for you     EKG 12-lead complete w/read  (to be scheduled)                 Further instructions from your care team       You have a follow up appt on Tuesday, February 28 at 8 AM  [address] [address] [phone]    Dr. Wang will write you a referral to see a cardiologist at [facility].       Pending Results     Date and Time Order Name Status Description    2/14/2017 1424 Lipid panel reflex to direct LDL In process             Statement of Approval      "Ordered          17 1445  I have reviewed and agree with all the recommendations and orders detailed in this document.  EFFECTIVE NOW     Approved and electronically signed by:  Teto Baker MD             Admission Information     Date & Time Provider Department Dept. Phone    2017 Agustin Bass MD [facility] [phone]      Your Vitals Were     Blood Pressure Pulse Temperature Respirations Height Weight    128/72 83 98.4  F (36.9  C) (Oral) 16 1.753 m (5' 9\") 97.3 kg (214 lb 8.1 oz)    Pulse Oximetry BMI (Body Mass Index)                97% 31.68 kg/m2          MyChart Information     [email or web address] lets you send messages to your doctor, view your test results, renew your prescriptions, schedule appointments and more. To sign up, go to [email or web address] . Click on \"Log in\" on the left side of the screen, which will take you to the Welcome page. Then click on \"Sign up Now\" on the right side of the page.     You will be asked to enter the access code listed below, as well as some personal information. Please follow the directions to create your username and password.     Your access code is: [email or web address]  Expires: 2017  5:46 AM     Your access code will  in 90 days. If you need help or a new code, please call your [facility] clinic or [phone].        Care EveryWhere ID     This is your Care EveryWhere ID. This could be used by other organizations to access your [facility] medical records  [id number]           Review of your medicines      START taking        Dose / Directions    apixaban ANTICOAGULANT 5 MG tablet   Commonly known as:  ELIQUIS        Dose:  5 mg   Take 1 tablet (5 mg) by mouth 2 times daily   Quantity:  60 tablet   Refills:  1       flecainide 50 MG tablet   Commonly known as:  TAMBOCOR        Dose:  50 mg   Take 1 tablet (50 mg) by mouth 2 times daily   Quantity:  60 tablet   Refills:  0         CONTINUE these medicines which " have NOT CHANGED        Dose / Directions    aspirin 81 MG tablet        Dose:  81 mg   Take 81 mg by mouth daily   Refills:  0       ATORVASTATIN CALCIUM PO        Dose:  10 mg   Take 10 mg by mouth daily   Refills:  0       METOPROLOL SUCCINATE ER PO        Dose:  25 mg   Take 25 mg by mouth daily   Refills:  0            Where to get your medicines      These medications were sent to [facility] [place], [place] - [address]  [address], [place] [address]     Phone:  [phone]     apixaban ANTICOAGULANT 5 MG tablet    flecainide 50 MG tablet                Protect others around you: Learn how to safely use, store and throw away your medicines at www.disposemymeds.org.             Medication List: This is a list of all your medications and when to take them. Check marks below indicate your daily home schedule. Keep this list as a reference.      Medications           Morning Afternoon Evening Bedtime As Needed    apixaban ANTICOAGULANT 5 MG tablet   Commonly known as:  ELIQUIS   Take 1 tablet (5 mg) by mouth 2 times daily   Next Dose Due:  Start tonight at bedtime, 2/14/17                           Start tonight at bedtime, 2/14/17           aspirin 81 MG tablet   Take 81 mg by mouth daily   Next Dose Due:  Tomorrow morning, 2/15/17                                   ATORVASTATIN CALCIUM PO   Take 10 mg by mouth daily   Last time this was given:  10 mg on 2/14/2017  8:01 AM   Next Dose Due:  Tomorrow morning, 2/15/17                                   flecainide 50 MG tablet   Commonly known as:  TAMBOCOR   Take 1 tablet (50 mg) by mouth 2 times daily   Last time this was given:  50 mg on 2/14/2017  2:17 PM   Next Dose Due:  Tomorrow morning, 2/15/17            Start tomorrow morning, 2/15/17                          METOPROLOL SUCCINATE ER PO   Take 25 mg by mouth daily   Next Dose Due:  Tomorrow morning, 2/15/17                                             More Information         Acetato de Flecainida, Tableta oral   Qué es amparo medicamento?  La FLECAINIDA es un agente antiarrítmico. Amparo medicamento se utiliza para prevenir el ritmo cardiaco irregular. También puede desacelerar los latidos cardiacos rápidos llamado taquicardia.  Amparo medicamento puede ser utilizado para otros usos; si tiene alguna pregunta consulte con bailey proveedor de atención médica o con bailey farmacéutico.   Qué le charanjit informar a mi profesional de la radha antes de carolina amparo medicamento?  Necesita saber si usted presenta alguno de los siguientes problemas o situaciones:    niveles anormales de potasio en la vishal    enfermedades cardíacas, incluyendo problemas de la frecuencia y ritmo cardiacos    enfermedad renal o hepática    ataque cardiaco previo    adam reacción alérgica o inusual a la flecainida, a anestésicos locales, a otros medicamentos, alimentos, colorantes o conservantes    si está embarazada o buscando quedar embarazada    si está amamantando a un bebé   Cómo charanjit utilizar amparo medicamento?  [place] amparo medicamento por vía oral con un vaso de agua. Siga las instrucciones de la etiqueta del medicamento. Puede carolina amparo medicamento con o sin alimentos. [place] jes dosis a intervalos regulares. No tome bailey medicamento con adam frecuencia mayor a la indicada. No deje de carolina amparo medicamento de manera abrupta. Si lo hace puede sufrir efectos secundarios graves relacionados con el corazón. Si bailey médico quiere que deje de carolina el medicamento, la dosis será reducida gradualmente para evitar efectos secundarios.  Hable con bailey pediatra para informarse acerca del uso de amparo medicamento en niños. Aunque amparo medicamento ha sido recetado a niños tan menores sana de 1 año de edad para condiciones selectivas, las precauciones se aplican.  Sobredosis: Póngase en contacto inmediatamente con un centro toxicológico o adam shirley de urgencia si usted herlinda que haya tomado demasiado medicamento.  ATENCIÓN: Amparo medicamento es  solo para usted. No comparta amparo medicamento con nadie.   Qué sucede si me olvido de adam dosis?  Si olvida adam dosis, tómela lo antes posible. Si es neo la hora de la próxima dosis, tome sólo solange dosis. No tome dosis adicionales o dobles.   Qué puede interactuar con amparo medicamento?  No tome esta medicina con ninguno de los siguientes medicamentos:    amoxapina    trióxido de arsénico    ciertos antibióticos, tales sana claritromicina, eritromicina, gatifloxacino, gemifloxacina, levofloxacino, moxifloxacina, esparfloxacino o troleandomicina    ciertos antidepresivos llamados antidepresivos tricíclicos tales sana amitriptilina, imipramina o nortriptilina    ciertos medicamentos para controlar el ritmo cardiaco, tales sana disopiramida, dofetilida, encainida, moricizina, procainamida, propafenona y quinidina    cisapride    ciclobenzaprina    delarvidina    droperidol    haloperidol    molina sanchez    imatinib    levometadilo    maprotilina    medicamentos para la malaria, tales sana cloroqina y halofantrina    pentamidina    fenotiazinas, tales sana clorpromacina, mesoridazina, proclorperazina, tioridazina    pimozida    quinina    ranolazina    ritonavir    sertindol    ziprasidona  Esta medicina también puede interactuar con los siguientes medicamentos:    cimetidina    medicamentos para la angina de pecho o la bee presión sanguínea    medicamentos para controlar el ritmo cardiaco, tales sana amiodarona y digoxina  Puede ser que esta lista no menciona todas las posibles interacciones. Informe a bailey profesional de la radha de todos los productos a base de hierbas, medicamentos de venta anisha o suplementos nutritivos que esté tomando. Si usted fuma, consume bebidas alcohólicas o si utiliza drogas ilegales, indíqueselo también a bailey profesional de la radha. Algunas sustancias pueden interactuar con bailey medicamento.   A qué charanjit estar atento al usar amparo medicamento?  Visite a bailey médico o a bailey profesional de la radha  para chequear bailey evolución periódicamente. Debido a que bailey enfermedad y el uso de amparo medicamento implican algunos riesgos, es conveniente llevar adam tarjeta, collar o pulsera de identificación con detalles acerca de bailey enfermedad, medicamentos y el nombre de bailey médico o bailey profesional de la radha.  Controle bailey presión sanguínea y frecuencia cardiaca regularmente. Pregunte a bailey profesional de la radha cuáles deben ser bailey frecuencia cardiaca y bailey presión sanguínea y cuándo deberá comunicarse con él o alcides. Bailey médico o bailey profesional de la radha también puede planear la realización de análisis de vishal y electrocardiogramas periódicos para controlar bailey evolución.  Puede experimentar mareos o desmayos. No conduzca ni utilice maquinaria, ni columba nada que le exija permanecer en estado de alerta hasta que sepa cómo le afecta amparo medicamento. No se siente ni se ponga de pie con rapidez, especialmente si es un paciente de edad avanzada. [place] puede reducir el riesgo de mareos o desmayos. El alcohol puede provocarle un mareo aún más intenso e incrementar el enrojecimiento y el pulso cardíaco. Evite consumir bebidas alcohólicas.   Qué efectos secundarios puedo tener al utilizar amparo medicamento?  Efectos secundarios que debe informar a bailey médico o a bailey profesional de la radha tan pronto sana sea posible:    dolor en el pecho, pulso cardiaco irregular continuo    dificultad al respirar    hinchazón de piernas o pies    temblores, tembleques    cansancio o debilidad inusual  Efectos secundarios que, por lo general, no requieren atención médica (debe informarlos a bailey médico o a bailey profesional de la radha si persisten o si son molestos):    visión borrosa    estreñimiento    dolor de rachel    náuseas, vómito    dolor estomacal  Puede ser que esta lista no menciona todos los posibles efectos secundarios. Comuníquese a bailey médico por asesoramiento médico sobre los efectos secundarios. Usted puede informar los efectos secundarios  a la FDA por teléfono al [phone].   Dónde charanjit guardar mi medicina?  [place]anté[place]ala fuera del alcance de los niños.  Guárdela a temperatura ambiente, entre 15 y 30 grados C (59 y 86 grados F). Protéjala abiola. Mantenga el envase eldon cerrado. Deseche todo el medicamento que no haya utilizado, después de la fecha de vencimiento.    [id number] [email or web address]. [address] [place] [address]. Todos los derechos reservados. Esta información no pretende sustituir la atención médica profesional. Sólo bailey médico puede diagnosticar y tratar un problema de radha.                Apixaban, Tableta Oral   Qué es amparo medicamento?  El APIXABAN es un anticoagulante (diluyente de la vishal). Se utiliza para reducir la posibilidad de derrame cerebral en los pacientes con adam afección médica llamada fibrilación auricular. Se utiliza también para tratar o prevenir coágulos sanguíneos en los pulmones o las venas.  Amparo medicamento puede ser utilizado para otros usos; si tiene alguna pregunta consulte con bailey proveedor de atención médica o con bailey farmacéutico.   Qué le charanjit informar a mi profesional de la radha antes de carolina amparo medicamento?  Necesita saber si usted presenta alguno de los siguientes problemas o situaciones:    trastornos de sangrado    sangrado en el cerebro    vishal en las heces (heces de color oscuro o de aspecto alquitranado) o si vomita vishal    antecedentes de sangrado estomacal    enfermedad renal    enfermedadhepática    válvula cardíaca mecánica    adam reacción alérgica o inusual al apixaban, otros medicamentos, alimentos, colorantes o conservantes    si está embarazada o buscando quedar embarazada    si está amamantando a un bebé   Cómo charanjit utilizar amparo medicamento?  [place] amparo medicamento por vía oral con un vaso de agua. Siga las instrucciones de la etiqueta del medicamento. Usted puede tomarlo con o sin alimentos. Si le produce malestar estomacal, tómelo con alimentos. [place] jes  dosis a intervalos regulares. No tome bailey medicamento con adam frecuencia mayor a la indicada. No deje de tomarlo excepto si así lo indica bailey médico. Dejando de usar amparo medicamento puede aumentar bailey riesgo de coágulos sanguíneos. Asegurarse de rellenar bailey receta antes de quedarse sin medicamento.  Hable con bailey pediatra para informarse acerca del uso de amparo medicamento en niños. Puede requerir atención especial.  Sobredosis: Póngase en contacto inmediatamente con un centro toxicológico o adam shirley de urgencia si usted herlinda que haya tomado demasiado medicamento.  ATENCIÓN: Amparo medicamento es solo para usted. No comparta amparo medicamento con nadie.   Qué sucede si me olvido de adam dosis?  Si se olvida adam dosis, tómela lo antes posible. Si es neo la hora de la próxima dosis, tome sólo solange dosis. No tome dosis adicionales o dobles.   Qué puede interactuar con amparo medicamento?  Esta medicina puede interactuar con los siguientes medicamentos:    aspirina o medicamentos tipo aspirina    ciertos medicamentos para infecciones micóticas tales sana quetoconazol e itraconazol    ciertos medicamentos para convulsiones tales sana carbamazepina y fenitoína    ciertos medicamentos que tratan o previenen coágulos sanguíneos, sana warfarina, enoxaparina y dalteparina    claritromicina    los NANO, medicamentos para el dolor o inflamación, sana ibuprofeno o naproxeno    rifampicina    ritonavir    hierba de [place]  Puede ser que esta lista no menciona todas las posibles interacciones. Informe a bailey profesional de la radha de todos los productos a base de hierbas, medicamentos de venta anisha o suplementos nutritivos que esté tomando. Si usted fuma, consume bebidas alcohólicas o si utiliza drogas ilegales, indíqueselo también a bailey profesional de la radha. Algunas sustancias pueden interactuar con bailey medicamento.   A qué charanjit estar atento al usar amparo medicamento?  Notifique a bailey médico o profesional de la radha y consigue  tratamiento de emergencia si desarrolla problemas respiratorios; cambios en la visión; dolor en el pecho; dolor de rachle grave, repentino; dolor, hinchazón, o calidez en la pierna, dificultar para hablar; entumecimiento o debilidad repentina de la nakul, brazo o pierna. Estos pueden ser signos que bailey afección haya empeorado.  Si va a someterse a adam operación, informe a bailey médico o a bailey profesional de la radha que está tomando amparo medicamento.  Informe a bailey profesional de la radha que yanelis amparo medicamento antes de someterse a un procedimiento gutierrez o epidural. A veces las personas que david amparo medicamento tienen problemas de sangrado alrededor de la columna vertebral cuando tienen un procedimiento gutierrez o epidural. Amparo sangrado es muy raro. Si usted tiene un procedimiento gutierrez o epidural, mientras esté tomando amparo medicamento, comuníquese con bailey profesional de la radha inmediatamente si usted tiene dolor de espalda, entumecimiento o hormigueo (especialmente en las piernas y pies), debilidad muscular, parálisis o pérdida del control intestinal o de la vejiga.  Evite los deportes y actividades que le puedan provocar lesiones mientras esté tomando amparo medicamento. Las caídas o lesiones severas pueden provocar sangrados que no pueden verse. Proceda con cuidado al utilizar herramientas o cuchillos afilados. Considere la posibilidad de utilizar adam rasuradora eléctrica. [place] precauciones especiales al cepillarse los dientes o al limpiarlos con hilo dental. Informe a bailey médico o a bailey profesional de la radha si observa lesiones, magulladuras o manchas rodriguez en la piel.   Qué efectos secundarios puedo tener al utilizar amparo medicamento?  Efectos secundarios que debe informar a bailey médico o a bailey profesional de la radha tan pronto sana sea posible:    reacciones alérgicas sana erupción cutánea, picazón o urticarias, hinchazón de la nakul, labios o lengua    signos y síntomas de sangrado tales sana heces de color  oscuro, con vishal o con aspecto alquitranado; orina ankit o marrón oscura; escupir vishal o material marrón que parece granos de café; puntos rojos en la piel; sangrado o magulladuras inusuales de los ojos, encías o nariz  Puede ser que esta lista no menciona todos los posibles efectos secundarios. Comuníquese a bailey médico por asesoramiento médico sobre los efectos secundarios. Usted puede informar los efectos secundarios a la FDA por teléfono al [phone].   Dónde charanjit guardar mi medicina?  [place]ala fuera del alcance de los niños.  Guárdela a temperatura ambiente, entre 20 y 25 grados C (68 y 77 grados F). Deseche todo el medicamento que no haya utilizado, después de la fecha de vencimiento.ATENCIÓN: Cyndi folleto es un resumen. Puede ser que no cubra toda la posible información. Si usted tiene preguntas acerca de esta medicina, consulte con bailey médico, bailye farmacéutico o bailey profesional de la radha.    [id number] [employer]. [address] [place] [address]. Todos los derechos reservados. Esta información no pretende sustituir la atención médica profesional. Sólo bailey médico puede diagnosticar y tratar un problema de radha.

## 2017-02-13 NOTE — ED PROVIDER NOTES
History     Chief Complaint:  Heart palpitations, dizziness    HPI   Due to language barrier, a phone  was used during the history-taking and subsequent discussion (and for part of the physical exam) with this patient.    Jordan Warner is a 51 year old male who presents with family for evaluation of dizziness and heart palpitations. He was recently seen and evaluated here in the emergency department on 2/5/2017, at which time he was having heart palpitations and was found to be in A fib with RVR. He was given Diltiazem which controlled his rate, but ultimately required cardioversion and was successfully cardioverted on the second attempt. He was discharged home with follow up with Cardiology. It was noted that his TSH, Troponin, and D dimer were normal.     The patient was seen by his PCP on 2/8/17. He did have an EKG done while he was in clinic last week and reports that he was told this test was normal. He was prescribed Atorvastatin and Metoprolol which he began taking on 2/9/17 and has taken daily as prescribed. He has not yet taken this medication today.     Over the last week, he reports having intermittent episodes of dizziness and palpitations. However, this morning at 0600, he woke up with a feeling of more significant dizziness and strong heart palpitations. He denies any chest pain today or over the last week. He denies any episodes of syncope. He does not smoke or use illicit drugs. He otherwise denies any leg pain, leg swelling, fevers, chills, or any other physical concerns at this time.    Cardiac Risk Factors  Sex:   Male  Tobacco:  No  Hypertension:  No  Diabetes:  No  Lipids:   No  Family History: No  Exercise:          No     PE/DVT Risk Factors  Recent Travel:  No  Recent Surgery: No  Tobacco:  No  Family History: No  Hormones:  No  Cancer:  No  Trauma:  No     Allergies:  NKDA     Medications:    Atorvastatin Calcium PO  Aspirin 81 mg tablets  Metoprolol succinate PO    Past  Medical History:    Atrial fibrillation  Prediabetes    Past Surgical History:    The patient does not have any past pertinent surgical history.     Family History:  Family history reviewed. No relevant family history.     Social History:  Patient presents to the emergency department with his wife.  Marital Status:   [2]     Review of Systems   Constitutional: Negative for chills and fever.   Cardiovascular: Positive for palpitations. Negative for chest pain and leg swelling.   Musculoskeletal:        Negative for leg pain.   Neurological: Positive for dizziness. Negative for syncope.   All other systems reviewed and are negative.      Physical Exam   First Vitals:  BP (!) 106/94  Pulse 157  Temp 98.2  F (36.8  C) (Oral)  Resp 18  SpO2 99%     Patient Vitals for the past 24 hrs:   BP Temp Temp src Pulse Heart Rate Resp SpO2   02/13/17 0900 (!) 106/94 - - - 118 - 99 %   02/13/17 0845 106/83 - - - 106 - 100 %   02/13/17 0815 (!) 140/111 - - - 129 - 100 %   02/13/17 0745 (!) 126/107 - - - 156 - -   02/13/17 0730 (!) 145/133 - - - 155 - 100 %   02/13/17 0716 (!) 126/103 - - 157 - 18 99 %   02/13/17 0715 (!) 126/103 98.2  F (36.8  C) Oral - 157 - 100 %      Physical Exam  General:  Well-nourished  Speaking in full sentences  Well appearing  No diaphoresis  Eyes:  Conjunctiva without injection or scleral icterus  PERRL  ENT:  Moist mucous membranes  Posterior oropharynx clear without erythema or exudate  Nares patent  Pinnae normal  Neck:  Full ROM  No stiffness appreciated  Resp:  Lungs CTAB  No crackles, wheezing or audible rubs  Good air movement  CV:   Irregularly irregular rate and rhythm  S1 and S2 present  No murmur, gallop or rub  GI:  BS present  Abdomen soft without distention  Non-tender to light and deep palpation  No guarding or rebound tenderness  Skin:  Warm, dry, well perfused  No rashes or open wounds on exposed skin  MSK:  Moves all extremities  No focal deformities or swelling  No calf tenderness  or asymmetry  Neuro:  Alert  Answers questions appropriately  Moves all extremities equally  Gait stable  Psych:  Normal affect, normal mood    Emergency Department Course   ECG @ 0708  Rate 151 bpm.   NH interval * ms.   QRS duration 92 ms.   QT/QTc 300/475 ms.   P-R-T axes * -14 37.  Notes: Atrial fibrillation with rapid ventricular response. Nonspecific ST abnormality. ST depression in V3-V6. Abnormal ECG.     No STEMI.  Time read 0712    Repeat ECG @ 1007  Rate 58 bpm.   NH interval 146 ms.   QRS duration 90 ms.   QT/QTc 374/367 ms.   P-R-T axes 14 -16 18.  Notes: Sinus bradycardia. Otherwise normal ECG.  No STEMI.  Time read 1025    ECG @ 1021  Rate 76 bpm.   NH interval 124 ms.   QRS duration 84 ms.   QT/QTc 366/411 ms.   P-R-T axes 58 64 29.  Notes: Normal sinus rhythm. Normal ECG.  No STEMI.    Time read 1025    Laboratory:  CMP: Glucose 134 (H), o/w WNL (Cr 0.85)  CBC: WNL (WBC 8.4, HGB 16.0, )     Troponin I (0750): 0.020    TSH: 3.14 (WNL)     Interventions:  0745  Normal Saline 1,000 mL, IV   Diltiazem 20 mg, IV  0817 Diltiazem 20 mg, IV  0930  Diltiazem 10 mg/hr, IV drip   Aspirin 324 mg, PO    Emergency Department Course:  0714 Nursing notes and vitals reviewed. I obtained a history from the patient. I performed an exam of the patient as documented above. We discussed the plan of care including ECG and laboratory studies.     0840 Recheck. The patient was updated and all questions were answered. He verbalized understanding and agreement. Patient overall feels improved, but continues to have dizziness if he tries to stand up to go to the bathroom.    0858 [facility] the [facility]     0938 Recheck. The patient was updated and all questions were answered. He verbalized understanding and agreement.     I personally reviewed the laboratory results with the Patient and family and answered all related questions prior to transfer    Rechecked the  patient, findings and plan explained to the patient, who consents to transfer via ambulance. Discussed the patient with Dr. Bass of the [facility] Hospitalist Service, who will admit the patient to a monitored bed for further monitoring, evaluation, and treatment.      1020 Recheck. The patient reports he is feeling better.    1028 Discussed the case with Dr. Loving for Cardiology.    EMTALA paperwork and transfer paperwork were completed and sent with the patient.     1038 Discussed the case with Dr. Bass.    Impression & Plan      Medical Decision Making:  Jordan Warner is a 51 year old, previously healthy male presenting to the emergency department accompanied by his wife for evaluation of tachycardia. Vital signs on presentation reveal a heart rate of 156, but otherwise within normal limits. History, exam, and ED course are as above. Symptoms are consistent with recurrent A fib with RVR. Prior records were reviewed, including his visit to this ED on 2/5 for which he underwent electrical cardioversion. He had since followed up with his PCP and was started on Metoprolol for rate control. Here in the ED, although he describes worsening symptoms since 0600 this morning, he also notes intermittent symptoms of dizziness over the preceding one week. For this reason I have concerns for duration of symptoms lasting greater than 48 hours, which limits the ability for cardioversion. At this time, he is not showing signs of hemodynamic compromise, altered mental status, ischemic chest pain, nor shortness of breath warranting aggressive intervention. He was started on Diltiazem, both bolus an then ultimately a Diltiazem drip with adequate rate control. CHADS2 Score is zero. He was provided a full dose Aspirin. During his ED course, patient ultimately converted to sinus rhythm.  Repeat EKG reveals hyperacute T-waves in anterior precordial leads, though no complaints of CP, SOB.  Symptomatically, he felt improved.   Case discussed with Dr. Loving, who had recommended admission, structural evaluation, and further titration of medications.  Patient and Dr. Bass were updated and agreeable.  Pt transferred in stable condition to [facility]. The patient was updated and is agreeable. All questions were answered prior to admission.    Diagnosis:    ICD-10-CM    1. Atrial fibrillation with RVR (H) I48.91 TSH       Disposition:  Transferred to [facility].    ITri, am serving as a scribe on 2/13/2017 at 7:14 AM to personally document services performed by Neri Khanna MD, based on my observations and the provider's statements to me.     Tri Flores  2/13/2017   [facility] EMERGENCY DEPARTMENT       Neri Khanna MD  02/13/17 1295

## 2017-02-13 NOTE — ED NOTES
phone used. Pt here with c/o fast heart rate and dizziness since about 0600 today. Denies CP or SOB. Pt saw cardiologist a few days ago and started on meds. Was seen here 8 days ago and had cardioversion. ABC intact.

## 2017-02-13 NOTE — H&P
PRIMARY CARE PROVIDER:  Patria Duran in [place].      CHIEF COMPLAINT:  Palpitations, dizziness.      HISTORY OBTAINED:  From the patient with his wife serving as an  as he is Monegasque speaking.  His wife did sign a waiver to defer formal  services.      HISTORY OF PRESENT ILLNESS:  Jordan Warner is a 51-year-old gentleman with a past medical history of recent diagnosis of afib with RVR and reported prediabetes who is a direct admission to [facility] from [facility] for further management of atrial fibrillation with RVR.  The patient was originally seen in the Emergency Department at [facility] on 02/05/2017 for heart palpitations.  At that time, he was found to be in afib with RVR.  He was given diltiazem, which improved his rate control, but he ultimately required cardioversion, which was successful on the second attempt.  He was discharged home without any medication and instructed to follow up with Cardiology.  His workup at that time showed a normal TSH, troponin and D-dimer.  The patient reports that he followed up with his primary care provider on 02/08/2017 and had a repeat EKG at that time which he was told was normal.  He was started on atorvastatin, metoprolol and daily aspirin.  The patient reports that upon waking up the morning of admission he noticed significant palpitations and dizziness which prompted him to present to the ED.  He was again evaluated in the ED at [facility] and found to be in atrial fibrillation with RVR at a rate of 157.  He reported that for the past week or so he has been waking up daily with some dizziness and mild nausea.  He states that he usually has a can of Coke and a piece of chocolate and his symptoms resolve for the rest of the day.  Dizziness and nausea have been occurring almost daily for the past week or two.  ED physician was concerned that these symptoms may represent a recurrence of his RVR and do not  feel comfortable cardioverting him.  He was started on a diltiazem drip and did eventually convert back to sinus rhythm.  ED physician discussed the case with Dr. Loving of Cardiology, who recommended that the patient still be transferred to [facility] as he may require stress testing or other additional evaluation.      The patient is presently evaluated in his room on the [facility].  He reports that at present he is feeling well overall.  He is no longer experiencing any palpitations.  Denies any dizziness at present.  He denies any chest pain, shortness of breath or visual changes.  He reports that again for the past 2 weeks or so he has been waking up every day with dizziness and mild nausea.  He describes this as a spinning feeling and says that it is occasionally associated with ringing in his right ear.  He is unsure if he is having hearing loss or not.  He had discussed this with PCP in the past per wife's report, who prescribed him a month long course of benzodiazepines, which improved his symptoms.  He denies any gait abnormalities or balance issues.  In regards to other cardiac symptoms, the patient states he walks up a few flights of stairs every morning with work and denies any associated chest pain or shortness of breath with these activities.  Prior to his original ED visit on 02/05/2017, he tells me that he had 1 prior episode of heart palpitations approximately 4 months ago which were self-limiting and he did not seek evaluation at that time.  He has no significant family history  of cardiac disease.      REVIEW OF SYSTEMS:  A 10-point review was conducted and is negative aside from the information in the HPI.      PAST MEDICAL HISTORY:   1.  Newly diagnosed atrial fibrillation, history as outlined above.  In short, the patient presented with new onset afib on 02/05/2017 to [facility] and was cardioverted at that time.  He was discharged to home and followed up with his PCP on 02/08.  At that  time, he was started on metoprolol, atorvastatin and daily aspirin.  His CHADS-VASc score is 0.  There is some difficulty in communication with language barrier, but from what I can understand, he has not yet followed up with a cardiologist.   2.  Prediabetes.  The patient's wife tells me that on his most recent physical he was told he was pre-diabetic, but he is not on any medications.      PAST SURGICAL HISTORY:  None.      ALLERGIES:  No known drug allergies.      PRIOR TO ADMISSION MEDICATIONS:   1.  Aspirin 81 mg daily.   2.  Atorvastatin 10 mg by mouth daily.   3.  Metoprolol succinate 25 mg daily.      SOCIAL HISTORY:  The patient reports very rare alcohol use, denies any drug use or smoking history.  He is .  He works as a  and  and is on his feet for most of the day.      FAMILY HISTORY:  Denies heart disease.      LABORATORY EVALUATION:  CMP is within normal limits with creatinine of 0.85.  Initial troponin this morning 0.02, subsequent 0.021.  Blood sugar was 134.  TSH 3.14.  CBC is within normal limits with white blood cell count of 8.4, hemoglobin 16.0, hematocrit 48.1 and platelets of 235.  Initial EKG showed atrial fibrillation with rapid ventricular response, as well as nonspecific ST wave abnormality.  Subsequent EKG following conversion showed sinus bradycardia.      PHYSICAL EXAMINATION:   VITAL SIGNS:  Temperature 98.2, heart rate 53, blood pressure 123/71, respiratory rate 16, oxygen saturation 98% on room air.   GENERAL:  Alert and oriented.  Latvian speaking gentleman.  His wife is serving as a .  He is lying down in bed, appears comfortable, appropriately conversant.   EYES:  Pupils are equal and reactive to light, EOMI, no nystagmus is noted.   EARS, NOSE, AND THROAT:  Mucous membranes are moist.   CARDIOVASCULAR:  Regular rhythm, bradycardic, no murmurs appreciated.  Distal pulses are +2/4.   RESPIRATORY:  Lungs are clear to auscultation bilaterally, no  increased work of breathing or wheezing.   GASTROINTESTINAL:  Positive bowel sounds.  Abdomen is soft and nontender.   MUSCULOSKELETAL:  The patient moves all 4 extremities.  Strength is equal bilaterally.   EXTREMITIES:  Warm and well perfused.  No significant lower extremity edema.  Varicose veins noted on right lower extremity.   NEUROLOGIC:  Cranial nerves II-XII are grossly intact.  No focal deficits.  Finger-to-nose testing is intact.  Face is symmetric and tongue is midline.  Negative pronator drift.      ASSESSMENT AND PLAN:  Jordan Warner is a 51-year-old male with a history of recently diagnosed atrial fibrillation and reported prediabetes who is a direct admission to [facility] from [facility] for further evaluation of atrial fibrillation with rapid ventricular response.  He is presently being admitted under observation status.   1.  Atrial fibrillation with rapid ventricular response.  The patient had new onset atrial fibrillation with rapid ventricular response on 02/05/2017 which was evaluated at [facility].  At that time, he was given diltiazem with control of his rate, but ultimately, he required cardioversion x2.  He was discharged and told to follow up with primary care provider.  At followup on 02/08/2017, they started him on metoprolol, aspirin and atorvastatin.  Since that time has reported almost daily dizziness and mild nausea in the morning that resolves through the day.  Prior to admission today, he experienced significant palpitations, prompting presentation at [facility].  Electrocardiogram showed atrial fibrillation with rapid ventricular response with rates in the 150s, as well as some nonspecific T-wave abnormalities.  The patient was put on a diltiazem drip and ultimately converted back to sinus rhythm.  Per sign out, Cardiology was contacted, who recommended transfer to [facility] for possible stress testing due to T-wave changes.  The  patient's CHADS2-VASc score is calculated at 0.   -- Cardiology consulted for their assistance, appreciate the consultation.   -- Monitor patient on telemetry.   -- Will obtain echocardiogram.  Defer further testing to Cardiology Service.   -- 5 mg of metoprolol intravenous q. 6 hours p.r.n. for elevated heart rate.   -- Will continue patient's prior to admission oral metoprolol.   -- Continue prior to admission aspirin.  Appreciate anticoagulation recommendations from Cardiology.   -- Continue to trend troponins x3.   2.  Dizziness.  Etiology of this is unclear.  The patient reports that almost daily he wakes up with dizziness and mild nausea.  Reportedly, he drinks a Coke, eats a piece of chocolate and feels better.  He does report some associated tinnitus in his right ear and is unsure whether he is experiencing hearing loss or not.  Overall, it is unclear whether his dizziness may be related to recurrent atrial fibrillation versus another cause such as Meniere's disease, benign paroxysmal positional vertigo, etc.  For now, will focus on managing his atrial fibrillation to see if dizziness subsequently improves.  If not, he would probably benefit from further workup with his primary care provider to look into this dizziness a little more closely.  On exam, the patient has no nystagmus, no neurological deficits or abnormalities, so I do not feel he needs urgent brain imaging at this time.  That would have low threshold if he develops any changes in a neurologic exam.   -- Management of atrial fibrillation as above.   -- Will need close primary care provider followup at discharge.   3.  Deep venous thrombosis prophylaxis.  Will continue prior to admission aspirin, appreciate further Cardiology recommendations.  The patient was notably cardioverted on 02/05/2017.      CODE STATUS:  The patient is FULL CODE.      The patient was seen and examined with Dr. Agustin Bass, who agrees with the above plan.          KYLEIGH ALEXIS MD       As dictated by KATARZYNA STOVER PA-C            D: 2017 14:03   T: 2017 14:56   MT: RAIN      Name:     AN TREJO   MRN:      [id number]        Account:      [id number]   :      1965           Admitted:     [id number]      Document: [id number]       cc: [facility]

## 2017-02-14 ENCOUNTER — APPOINTMENT (OUTPATIENT)
Dept: CARDIOLOGY | Facility: CLINIC | Age: 52
End: 2017-02-14
Attending: INTERNAL MEDICINE

## 2017-02-14 ENCOUNTER — APPOINTMENT (OUTPATIENT)
Dept: NUCLEAR MEDICINE | Facility: CLINIC | Age: 52
End: 2017-02-14
Attending: INTERNAL MEDICINE

## 2017-02-14 ENCOUNTER — APPOINTMENT (OUTPATIENT)
Dept: CARDIOLOGY | Facility: CLINIC | Age: 52
End: 2017-02-14
Attending: PHYSICIAN ASSISTANT

## 2017-02-14 VITALS
SYSTOLIC BLOOD PRESSURE: 128 MMHG | HEART RATE: 83 BPM | OXYGEN SATURATION: 97 % | HEIGHT: 69 IN | TEMPERATURE: 98.4 F | RESPIRATION RATE: 16 BRPM | BODY MASS INDEX: 31.77 KG/M2 | DIASTOLIC BLOOD PRESSURE: 72 MMHG | WEIGHT: 214.51 LBS

## 2017-02-14 LAB
CHOLEST SERPL-MCNC: 159 MG/DL
HBA1C MFR BLD: 6.6 % (ref 4.3–6)
HDLC SERPL-MCNC: 59 MG/DL
LDLC SERPL CALC-MCNC: 75 MG/DL
NONHDLC SERPL-MCNC: 100 MG/DL
TRIGL SERPL-MCNC: 124 MG/DL

## 2017-02-14 PROCEDURE — 93306 TTE W/DOPPLER COMPLETE: CPT | Mod: 26 | Performed by: INTERNAL MEDICINE

## 2017-02-14 PROCEDURE — 40000264 ECHO COMPLETE WITH LUMASON

## 2017-02-14 PROCEDURE — 99204 OFFICE O/P NEW MOD 45 MIN: CPT | Performed by: INTERNAL MEDICINE

## 2017-02-14 PROCEDURE — 93017 CV STRESS TEST TRACING ONLY: CPT

## 2017-02-14 PROCEDURE — 80061 LIPID PANEL: CPT | Performed by: INTERNAL MEDICINE

## 2017-02-14 PROCEDURE — 34300033 ZZH RX 343: Performed by: INTERNAL MEDICINE

## 2017-02-14 PROCEDURE — 36415 COLL VENOUS BLD VENIPUNCTURE: CPT | Performed by: INTERNAL MEDICINE

## 2017-02-14 PROCEDURE — 99214 OFFICE O/P EST MOD 30 MIN: CPT | Mod: 25 | Performed by: INTERNAL MEDICINE

## 2017-02-14 PROCEDURE — 25000128 H RX IP 250 OP 636: Performed by: INTERNAL MEDICINE

## 2017-02-14 PROCEDURE — 78452 HT MUSCLE IMAGE SPECT MULT: CPT

## 2017-02-14 PROCEDURE — 99217 ZZC OBSERVATION CARE DISCHARGE: CPT | Performed by: INTERNAL MEDICINE

## 2017-02-14 PROCEDURE — 83036 HEMOGLOBIN GLYCOSYLATED A1C: CPT | Performed by: INTERNAL MEDICINE

## 2017-02-14 PROCEDURE — G0378 HOSPITAL OBSERVATION PER HR: HCPCS

## 2017-02-14 PROCEDURE — 25000132 ZZH RX MED GY IP 250 OP 250 PS 637: Performed by: PHYSICIAN ASSISTANT

## 2017-02-14 PROCEDURE — 78452 HT MUSCLE IMAGE SPECT MULT: CPT | Mod: 26 | Performed by: INTERNAL MEDICINE

## 2017-02-14 PROCEDURE — A9502 TC99M TETROFOSMIN: HCPCS | Performed by: INTERNAL MEDICINE

## 2017-02-14 PROCEDURE — 93018 CV STRESS TEST I&R ONLY: CPT | Performed by: INTERNAL MEDICINE

## 2017-02-14 PROCEDURE — 40000855 ZZH STATISTIC ECHO STRESS OR NM NPI

## 2017-02-14 PROCEDURE — 25500064 ZZH RX 255 OP 636: Performed by: INTERNAL MEDICINE

## 2017-02-14 PROCEDURE — 93016 CV STRESS TEST SUPVJ ONLY: CPT | Performed by: INTERNAL MEDICINE

## 2017-02-14 RX ORDER — AMINOPHYLLINE 25 MG/ML
50-100 INJECTION, SOLUTION INTRAVENOUS
Status: DISCONTINUED | OUTPATIENT
Start: 2017-02-14 | End: 2017-02-14 | Stop reason: HOSPADM

## 2017-02-14 RX ORDER — REGADENOSON 0.08 MG/ML
0.4 INJECTION, SOLUTION INTRAVENOUS ONCE
Status: COMPLETED | OUTPATIENT
Start: 2017-02-14 | End: 2017-02-14

## 2017-02-14 RX ORDER — ALBUTEROL SULFATE 90 UG/1
2 AEROSOL, METERED RESPIRATORY (INHALATION) EVERY 5 MIN PRN
Status: DISCONTINUED | OUTPATIENT
Start: 2017-02-14 | End: 2017-02-14 | Stop reason: HOSPADM

## 2017-02-14 RX ORDER — FLECAINIDE ACETATE 50 MG/1
50 TABLET ORAL 2 TIMES DAILY
Qty: 60 TABLET | Refills: 0 | Status: SHIPPED | OUTPATIENT
Start: 2017-02-14 | End: 2017-02-20

## 2017-02-14 RX ORDER — FLECAINIDE ACETATE 50 MG/1
50 TABLET ORAL EVERY 12 HOURS SCHEDULED
Status: DISCONTINUED | OUTPATIENT
Start: 2017-02-14 | End: 2017-02-14 | Stop reason: HOSPADM

## 2017-02-14 RX ORDER — ACYCLOVIR 200 MG/1
0-1 CAPSULE ORAL
Status: DISCONTINUED | OUTPATIENT
Start: 2017-02-14 | End: 2017-02-14 | Stop reason: HOSPADM

## 2017-02-14 RX ADMIN — TETROFOSMIN 31.6 MCI.: 0.23 INJECTION, POWDER, LYOPHILIZED, FOR SOLUTION INTRAVENOUS at 09:56

## 2017-02-14 RX ADMIN — TETROFOSMIN 10.2 MCI.: 0.23 INJECTION, POWDER, LYOPHILIZED, FOR SOLUTION INTRAVENOUS at 07:50

## 2017-02-14 RX ADMIN — FLECAINIDE ACETATE 50 MG: 50 TABLET ORAL at 14:17

## 2017-02-14 RX ADMIN — SULFUR HEXAFLUORIDE 2 ML: KIT at 11:15

## 2017-02-14 RX ADMIN — ASPIRIN 81 MG: 81 TABLET, COATED ORAL at 08:02

## 2017-02-14 RX ADMIN — REGADENOSON 0.4 MG: 0.08 INJECTION, SOLUTION INTRAVENOUS at 09:53

## 2017-02-14 RX ADMIN — ATORVASTATIN CALCIUM 10 MG: 10 TABLET, FILM COATED ORAL at 08:01

## 2017-02-14 NOTE — PLAN OF CARE
Problem: Goal Outcome Summary  Goal: Goal Outcome Summary  Outcome: No Change  VSS. Tele shows SB/SR. Pt denies any chest pain or SOB. He continues to have c/o dizziness after ambulation. Plan is for familia scan, EP consult and ECHO tomorrow. Wife at bedside. Will continue to monitor pt.

## 2017-02-14 NOTE — CONSULTS
"CONSULTATION      DATE OF CONSULTATION:  02/14/2017.      REASON FOR CONSULTATION:  Evaluation of atrial fibrillation.      HISTORY OF PRESENT ILLNESS:  Mr. Jordan Warner is a pleasant 51-year-old gentleman with a history of prediabetes and borderline hypertension who is here for evaluation of atrial fibrillation.      The patient reports having intermittent episodes of palpitations which started in the last couple months.  On 02/05/2017 he had an episode which was more sustained, and associated with his shortness of breath.  He came to the ED and underwent cardioversion.  At that time he was started on metoprolol and aspirin.  He did well; however, 02/13/2017 he had recurrence of palpitations and was found to be in atrial flutter.  He was started on a diltiazem drip, which resulted in termination of tachycardia.      At the moment, he is doing well and back in normal rhythm.  He underwent a stress test this morning.  Results are still pending.      EKG on admission was compatible with possible atrial flutter.  Subsequent EKG confirmed normal sinus rhythm without clear signs of preexcitation.      ASSESSMENT AND PLAN:   1.  Paroxysmal atrial fibrillation.  He seems to be very symptomatic with it.  He failed therapy with metoprolol.  We will await the results of the echo/stress test and if they are within normal limits I recommend starting flecainide.  He should follow up in a week to evaluate EKG in clinic.   2.  Embolic prevention.  CHADS-VASc score is 0.  However, he is having recurrent episodes of AFib.  Recommend starting anticoagulation for a month and he will settle down with regard to management of atrial fibrillation.  Reevaluating coagulation after a month.          PHYSICAL EXAM:  Vitals: /72  Pulse 83  Temp 98.4  F (36.9  C) (Oral)  Resp 16  Ht 1.753 m (5' 9\")  Wt 97.3 kg (214 lb 8.1 oz)  SpO2 97%  BMI 31.68 kg/m2    No intake or output data in the 24 hours ending 02/27/17 0832  Vitals:    02/13/17 " 1131 17 0500   Weight: 97.1 kg (214 lb) 97.3 kg (214 lb 8.1 oz)       Constitutional:  AAO x3.  Pt is in NAD.  HEAD: Normocephalic.  SKIN: Skin normal color, texture and turgor with no lesions or eruptions.  Eyes: PERRL, EOMI.  ENT:  Supple, normal JVP. No lymphadenopathy or thyroid enlargement.  Chest:  CTAB.  Cardiac:    RRR, normal  S1 and S2.  No murmurs rubs or gallop.    Abdomen:  Normal BS.  Soft, non-tender and non-distended.  No rebound or guarding.    Extremities:  Pedious pulses palpable B/L.  No LE edema noticed.   Neurological: Strength and sensation grossly symmetric and intact throughout.       Review of Systems:   As per subjective, otherwise 5 systems reviewed and negative.    CURRENT MEDICATIONS:    PRN Meds:     ALLERGIES   No Known Allergies    PAST MEDICAL HISTORY:  Past Medical History   Diagnosis Date     Atrial fibrillation (H) 2017     Cardioverted in Emergency dept ([facility])     Prediabetes        PAST SURGICAL HISTORY:  No past surgical history on file.    FAMILY HISTORY:  No family history on file.    SOCIAL HISTORY:  Social History     Social History     Marital status:      Spouse name: N/A     Number of children: N/A     Years of education: N/A     Social History Main Topics     Smoking status: Never Smoker     Smokeless tobacco: Not on file     Alcohol use Not on file     Drug use: Not on file     Sexual activity: Not on file     Other Topics Concern     Not on file     Social History Narrative       Review of Systems:  Complete review of system is otherwise negative with the exception of what was described above.     Recent Lab Results:  No lab results found in last 7 days.         GRADY RAMIREZ MD             D: 2017 09:54   T: 2017 10:05   MT:       Name:     AN TREJO   MRN:      [id number]        Account:       [id number]   :      1965           Consult Date:  2017      Document: [id number]

## 2017-02-14 NOTE — PROGRESS NOTES
Here for lexiscan stress test.  Assessment complete, wife here as .  No pain pre procedure.  VSS, IV patent, lungs clear, no wheezing.  Procedure explained.

## 2017-02-14 NOTE — PROGRESS NOTES
The following appointments have been made:    Feb 21, 2017  9:30 AM CST   ecg with [facility] ([facility])    [address]  [place] [id number] [phone]               Mar 21, 2017  3:45 PM CDT   [facility] EP RETURN with Greg Stout MD   [facility] [place] ([facility])    [facility]  [place] [id number] [phone]

## 2017-02-14 NOTE — PROGRESS NOTES
Met with pt and wife. Chinese obs brochure given. Pt has no insurance and goes to [facility] in [facility]. His PCP is Corina Wang MD.

## 2017-02-14 NOTE — PHARMACY
Statin check for pricing - Ran highest dose of each medication for qty 30 (patient can cut for cost effectiveness as needed - will send pill cutter)    Atorvastatin 80 mg - $43  Pravastatin 80 mg - $23  Lovastatin 40 mg - $21  Simvastatin 80 mg - $20    Metoprolol ER 25 mg - $36 (30 day supply)  50 mg can cut in half qty 30 - $36 (60 day supply)    Thank you,  Simon Herzog CPhT  [facility] Discharge Pharmacy Liaison  [phone]

## 2017-02-14 NOTE — DISCHARGE INSTRUCTIONS
You have a follow up appt on Tuesday, February 28 at 8 AM  [facility]  [address] [place] [address]  [phone]    Dr. Wang will write you a referral to see a cardiologist at [facility].

## 2017-02-14 NOTE — PROGRESS NOTES
VSS. Tele shows SR/SB. Pt denies any CP or SOB. Pt started on Flecainide, tolerating well. Discharge instructions, medication indications/side effects, and follow up instructions discussed w/ pt and spouse.  refused, form signed. Handouts given in Croatian and english. All questions answered. All pt belongings are accounted for and sent home w/ pt. Pt left floor via foot and was driven home by spouse.

## 2017-02-14 NOTE — PROGRESS NOTES
Full consult dictated.  In brief, 50 yo M with Hx of symptomatic PAF.  He failed therapy with metoprolol.    Plan:    1. F/u echo/ stress test result. If negative for ischemia and no significant LVH, we may start flecainide (50 mg BID) in addition to metoprolol.  He will f/u in clinic in 1 week (02/20 at 3:10 PM)  2. Start NOAC for 1 mo.  We will reevaluate need for OAC in clinic.    AF-AFL  get with AHA guidelines  note  1. [id number] score: 0  2. Anticoagulant on discharge: NOAC for 1 mo (due to recurrent episodes of Afib and CDV).   3. CMP (EF< 40%): No.   4. History of CAD, PAD, CVA/TIA: No.    Greg Stout MD

## 2017-02-14 NOTE — PHARMACY
DC pharmacy filling 5mg Eliquis through coupon card to start patient on medication. If more samples are needed there is a 42 day supply available on  (was informed possible only 30 days needed, will send home with SD pharm medication)    Thank you,  Simon Herzog [facility] Discharge Pharmacy Liaison  [phone]

## 2017-02-14 NOTE — PROGRESS NOTES
[facility]    Cardiology Progress Note    Date of Service (when I saw the patient): 02/14/2017     Assessment & Plan   Jordan Warner is a 51 year old male who was admitted on 2/13/2017 with palpitations and dizziness. Found to be in afib with RVR (was recently dx prior to this admission).     1. PAF. Back in SR now. EP saw pt today. Plan is to consider Flecainide (in addition to metoprolol) if echo and stress test are normal. They are also recommending a NOAC for at least 1 month.  2. Borderline HTN. Continue metoprolol and monitor for elevated BPs; may require low dose ACE inhibitor.  3. Pre-diabetes/DM. Hgb A1C today came back at 6.6    Awaiting stress test and echo results, further recs to follow. If these look to general cardiology will sign off and pt can follow up with EP cardiology with out patient ECG and holter monitor..     Kristan Leggett PA-C    ADDENDUM: Stress test and echo results are normal. Reviewed with Dr Stout. Will start flecainide 50mg BID and Eliquis 5mg BID. Will give first dose of flecainide today and first dose of Eliquis tomorrow. If he remains stable after the dose of flecainide he may be able to d/c later today. Dr Winchester to still see this afternoon. Kristan Leggett PA-C     I have reviewed patient data, examined patient, and agree with prescribed plan.    Andrés Winchester MD [facility]    Interval History   Overall feels better this AM. Does have a FLETCHER after his stress test.     Physical Exam   Temp: 97.4  F (36.3  C) Temp src: Oral BP: 139/75 Pulse: 83 Heart Rate: 77 Resp: 16 SpO2: 93 % O2 Device: None (Room air)    Vitals:    02/13/17 1131 02/14/17 0500   Weight: 97.1 kg (214 lb) 97.3 kg (214 lb 8.1 oz)     Vital Signs with Ranges  Temp:  [97.4  F (36.3  C)-98.2  F (36.8  C)] 97.4  F (36.3  C)  Pulse:  [61-83] 83  Heart Rate:  [] 77  Resp:  [16-18] 16  BP: (113-148)/(58-80) 139/75  SpO2:  [93 %-98 %] 93 %       Constitutional     alert and oriented, in  no acute distress.     Skin     warm and dry to touch    ENT     no pallor or cyanosis    Lungs  clear to auscultation     Cardiac  regular rhythm, S1 normal, S2 normal, No S3 or S4, no murmurs, no rubs    Extremities and Back     No edema observed.        Neurological     no gross motor deficits noted, affect appropriate, oriented to time, person and place.      Medications     Reason anticoagulant not prescribed for atrial fibrillation         sodium chloride (PF)  10 mL Intravenous Once     senna-docusate  1-2 tablet Oral BID     metoprolol (TOPROL-XL) 24 hr tablet 25 mg  25 mg Oral Daily     atorvastatin (LIPITOR) tablet 10 mg  10 mg Oral Daily     aspirin EC  81 mg Oral Daily       Data   Most Recent 3 CBC's:  Recent Labs   Lab Test  02/13/17   0750  02/05/17   0315   WBC  8.4  8.8   HGB  16.0  16.0   MCV  88  90   PLT  235  274     Most Recent 3 BMP's:  Recent Labs   Lab Test  02/13/17   0750  02/05/17   0315   NA  140  140   POTASSIUM  3.9  3.8   CHLORIDE  107  107   CO2  24  26   BUN  13  15   CR  0.85  0.83   ANIONGAP  9  7   HARSHA  8.7  8.9   GLC  134*  147*     Most Recent 3 Troponin's:  Recent Labs   Lab Test  02/13/17   1420  02/13/17   1023  02/13/17   0750   TROPI  0.018  0.021  0.020     Most Recent Hemoglobin A1c:  Recent Labs   Lab Test  02/14/17   0515   A1C  6.6*

## 2017-02-14 NOTE — PROGRESS NOTES
Pt tolerated lexiscan stress test, did not ambulated d/t stated feeling lightheaded today.  VSS, reports stomach upset and headache, and jaw discomfort all symptoms brief and resolved quickly.  Pt reports no CP.  Reports small headache at time of returning to unit.  Return to unit at 1001 via WC.

## 2017-02-14 NOTE — PHARMACY
Flecainide check per MD    100mg tablet (1/2 twice daily) 30 tabs - $~77 - cheapest option for Flecainide     Thank you,  Simon Herzog CPhT  [facility] Discharge Pharmacy Liaison  [phone]

## 2017-02-14 NOTE — PLAN OF CARE
Problem: Goal Outcome Summary  Goal: Goal Outcome Summary  Outcome: No Change  Pt rested and slept well most of night. Wife at bedside, waiting for tests to start.

## 2017-02-15 ENCOUNTER — CARE COORDINATION (OUTPATIENT)
Dept: CARDIOLOGY | Facility: CLINIC | Age: 52
End: 2017-02-15

## 2017-02-15 NOTE — PROGRESS NOTES
Called patient and left  to discuss any post hospital d/c questions he may have, review medication changes, and confirm f/u appts.  RN advised patient in  that he has an apt scheduled on 2/20/17 with MEGHANN Rosalba Batista. Patient advised in  to call clinic with any cardiac related questions or concerns prior to his apt on 2/20/17.

## 2017-02-20 ENCOUNTER — OFFICE VISIT (OUTPATIENT)
Dept: CARDIOLOGY | Facility: CLINIC | Age: 52
End: 2017-02-20

## 2017-02-20 VITALS
HEIGHT: 68 IN | DIASTOLIC BLOOD PRESSURE: 66 MMHG | WEIGHT: 219 LBS | HEART RATE: 52 BPM | BODY MASS INDEX: 33.19 KG/M2 | SYSTOLIC BLOOD PRESSURE: 110 MMHG

## 2017-02-20 DIAGNOSIS — K21.9 GASTROESOPHAGEAL REFLUX DISEASE WITHOUT ESOPHAGITIS: ICD-10-CM

## 2017-02-20 DIAGNOSIS — I48.91 ATRIAL FIBRILLATION WITH RAPID VENTRICULAR RESPONSE (H): Primary | ICD-10-CM

## 2017-02-20 PROCEDURE — 93000 ELECTROCARDIOGRAM COMPLETE: CPT | Performed by: PHYSICIAN ASSISTANT

## 2017-02-20 PROCEDURE — 99213 OFFICE O/P EST LOW 20 MIN: CPT | Mod: 25 | Performed by: PHYSICIAN ASSISTANT

## 2017-02-20 RX ORDER — FLECAINIDE ACETATE 50 MG/1
50 TABLET ORAL 2 TIMES DAILY
Qty: 60 TABLET | Refills: 1 | Status: SHIPPED | OUTPATIENT
Start: 2017-02-20 | End: 2017-04-05

## 2017-02-20 NOTE — PROGRESS NOTES
HPI and Plan:   See dictation #[id number]    Orders Placed This Encounter   Procedures     EKG 12-lead complete w/read - Clinics (performed today)       Orders Placed This Encounter   Medications     flecainide (TAMBOCOR) 50 MG tablet     Sig: Take 1 tablet (50 mg) by mouth 2 times daily     Dispense:  60 tablet     Refill:  1     ranitidine (ZANTAC) 150 MG tablet     Sig: Take 1 tablet (150 mg) by mouth once as needed for heartburn       Medications Discontinued During This Encounter   Medication Reason     flecainide (TAMBOCOR) 50 MG tablet Reorder         Encounter Diagnoses   Name Primary?     Atrial fibrillation with rapid ventricular response (H) Yes     Gastroesophageal reflux disease without esophagitis        CURRENT MEDICATIONS:  Current Outpatient Prescriptions   Medication Sig Dispense Refill     flecainide (TAMBOCOR) 50 MG tablet Take 1 tablet (50 mg) by mouth 2 times daily 60 tablet 1     ranitidine (ZANTAC) 150 MG tablet Take 1 tablet (150 mg) by mouth once as needed for heartburn       apixaban ANTICOAGULANT (ELIQUIS) 5 MG tablet Take 1 tablet (5 mg) by mouth 2 times daily 60 tablet 1     ATORVASTATIN CALCIUM PO Take 10 mg by mouth daily        aspirin 81 MG tablet Take 81 mg by mouth daily       METOPROLOL SUCCINATE ER PO Take 25 mg by mouth daily        [DISCONTINUED] flecainide (TAMBOCOR) 50 MG tablet Take 1 tablet (50 mg) by mouth 2 times daily 60 tablet 0       ALLERGIES   No Known Allergies    PAST MEDICAL HISTORY:  Past Medical History   Diagnosis Date     Atrial fibrillation (H) 1/2017     Cardioverted in Emergency dept ([facility])     Prediabetes        PAST SURGICAL HISTORY:  No past surgical history on file.    FAMILY HISTORY:  History reviewed. No pertinent family history.    SOCIAL HISTORY:  Social History     Social History     Marital status:      Spouse name: N/A     Number of children: N/A     Years of education: N/A     Social History Main Topics     Smoking status: Never Smoker      "Smokeless tobacco: None     Alcohol use None     Drug use: None     Sexual activity: Not Asked     Other Topics Concern     None     Social History Narrative       Review of Systems:  Skin:  Negative       Eyes:  Negative      ENT:  Negative      Respiratory:  Negative for shortness of breath;dyspnea on exertion;cough     Cardiovascular:  Negative for;palpitations;chest pain;edema;cyanosis;exercise intolerance;fatigue;lightheadedness      Gastroenterology: Negative for melena;hematochezia    Genitourinary:  Negative      Musculoskeletal:  Negative      Neurologic:  Negative      Psychiatric:  Positive for excessive stress    Heme/Lymph/Imm:  Negative      Endocrine:  Negative        Physical Exam:  Vitals: /66  Pulse 52  Ht 1.727 m (5' 8\")  Wt 99.3 kg (219 lb)  BMI 33.3 kg/m2    Constitutional:  cooperative, alert and oriented, well developed, well nourished, in no acute distress        Skin:  warm and dry to the touch, no apparent skin lesions or masses noted        Head:  normocephalic, no masses or lesions        Eyes:  pupils equal and round;sclera white;conjunctivae and lids unremarkable;no xanthalasma        ENT:  no pallor or cyanosis, dentition good        Neck:  no carotid bruit;JVP normal        Chest:  normal breath sounds, clear to auscultation, normal A-P diameter, normal symmetry, normal respiratory excursion, no use of accessory muscles          Cardiac: regular rhythm, normal S1/S2, no S3 or S4, apical impulse not displaced, no murmurs, gallops or rubs                  Abdomen:  abdomen soft        Vascular: pulses full and equal                                        Extremities and Back:  no deformities, clubbing, cyanosis, erythema observed;no edema              Neurological:  affect appropriate, oriented to time, person and place                "

## 2017-02-20 NOTE — MR AVS SNAPSHOT
After Visit Summary   2/20/2017    Jordan Warner    MRN: [id number]           Patient Information     Date Of Birth          1965        Visit Information        Provider Department      2/20/2017 3:00 PM Osiris Batista PA-C; [facility] [place]        Today's Diagnoses     Atrial fibrillation with rapid ventricular response (H)    -  1    Gastroesophageal reflux disease without esophagitis          Care Instructions    1. EKG today showed normal rhythm - hooray!    2. Continue flecainide 50 mg twice a day (refill sent to [facility]) and Eliquis 5 mg twice a day (samples given). See Dr. Stout as planned    3. Our AFib nurses are Stephie/Angela: [phone] - call if ANY issues/questions about heart    4. Your cholesterol levels look great!        Follow-ups after your visit        Your next 10 appointments already scheduled     Feb 21, 2017  9:15 AM CST   ecg with [facility]   [facility] ([facility])    [address]  [facility] [id number] [phone]            Mar 21, 2017  3:45 PM CDT   [facility] EP RETURN with Greg Stout MD   [facility] ([facility])    [address] [place] [address] [address]  [facility] [address] [phone]              Who to contact     If you have questions or need follow up information about today's clinic visit or your schedule please contact [facility] directly at [phone].  Normal or non-critical lab and imaging results will be communicated to you by MyChart, letter or phone within 4 business days after the clinic has received the results. If you do not hear from us within 7 days, please contact the clinic through MyChart or phone. If you have a critical or abnormal lab result, we will notify you by  "phone as soon as possible.  Submit refill requests through [employer] or call your pharmacy and they will forward the refill request to us. Please allow 3 business days for your refill to be completed.          Additional Information About Your Visit        [employer] Information     [employer] lets you send messages to your doctor, view your test results, renew your prescriptions, schedule appointments and more. To sign up, go to [email or web address] . Click on \"Log in\" on the left side of the screen, which will take you to the Welcome page. Then click on \"Sign up Now\" on the right side of the page.     You will be asked to enter the access code listed below, as well as some personal information. Please follow the directions to create your username and password.     Your access code is: [email or web address]  Expires: 2017  5:46 AM     Your access code will  in 90 days. If you need help or a new code, please call your [place] clinic or [phone].        Care EveryWhere ID     This is your Care EveryWhere ID. This could be used by other organizations to access your [place] medical records  [id number]        Your Vitals Were     Pulse Height BMI (Body Mass Index)             52 1.727 m (5' 8\") 33.3 kg/m2          Blood Pressure from Last 3 Encounters:   17 110/66   17 128/72   17 127/74    Weight from Last 3 Encounters:   17 99.3 kg (219 lb)   17 97.3 kg (214 lb 8.1 oz)   17 93.4 kg (206 lb)              We Performed the Following     EKG 12-lead complete w/read - Clinics (performed today)          Where to get your medicines      These medications were sent to [facility] [address] [place] [address] - [address] AT [place] [address] & [facility] [address]  [address], [facility] [address]     Phone:  [phone]     flecainide 50 MG tablet          Primary Care Provider Fax #    Patria Duran [phone]       [address] [place] [address] Attn: HIM Dept  [facility] " [id number]        Thank you!     Thank you for choosing [facility] [employer]  for your care. Our goal is always to provide you with excellent care. Hearing back from our patients is one way we can continue to improve our services. Please take a few minutes to complete the written survey that you may receive in the mail after your visit with us. Thank you!             Your Updated Medication List - Protect others around you: Learn how to safely use, store and throw away your medicines at www.disposemymeds.org.          This list is accurate as of: 2/20/17  3:49 PM.  Always use your most recent med list.                   Brand Name Dispense Instructions for use    apixaban ANTICOAGULANT 5 MG tablet    ELIQUIS    60 tablet    Take 1 tablet (5 mg) by mouth 2 times daily       aspirin 81 MG tablet      Take 81 mg by mouth daily       ATORVASTATIN CALCIUM PO      Take 10 mg by mouth daily       flecainide 50 MG tablet    TAMBOCOR    60 tablet    Take 1 tablet (50 mg) by mouth 2 times daily       METOPROLOL SUCCINATE ER PO      Take 25 mg by mouth daily       ZANTAC 150 MG tablet   Generic drug:  ranitidine      Take 1 tablet (150 mg) by mouth once as needed for heartburn

## 2017-02-20 NOTE — PATIENT INSTRUCTIONS
1. EKG today showed normal rhythm - hooray!    2. Continue flecainide 50 mg twice a day (refill sent to [facility]) and Eliquis 5 mg twice a day (samples given). See Dr. Stout as planned    3. Our AFib nurses are Stephie/Angela: [phone] - call if ANY issues/questions about heart    4. Your cholesterol levels look great!

## 2017-02-20 NOTE — LETTER
2/20/2017    [facility]  [address]   Attn: Him Dept  [facility] [address]    RE: Jordan Warner       Dear Colleague,    I had the pleasure of seeing Mr. Warner today when he came accompanied by his wife and  for followup of his recent hospitalization.  He is a very pleasant 51-year-old.  On 02/05 he presented to [facility] complaining of significant palpitations.  He was noted to be in atrial fibrillation with a heart rate of over 120 beats per minute.  He was started on diltiazem and underwent DC cardioversion.  He was started on metoprolol and aspirin.  He was instructed to follow up; however, before he could do so, he re-presented to [facility] on 02/13 with recurrent palpitations.  He was sent to [facility], where he saw Dr. Stout on 02/13.      Dr. Stout noted that he appeared to be in what looked like atrial flutter.  He was started on a diltiazem drip and spontaneously converted to sinus rhythm.  During that hospitalization stress testing was done showing no evidence of ischemia.  Echocardiogram was done showing a normal ejection fraction at 55%-60% and normal left atrial size.  His TSH was normal, and there was no cause of his recurrent atrial fibrillation found.  Once his stress test was found to be normal, he was started on flecainide 50 mg twice daily.  Despite a CHADS-VASc score of 0 (with only prediabetes and borderline hypertension noted), he was also discharged on Eliquis to be continued for roughly 1 month.  He was continued on beta blocker and statin therapy.      He was discharged home on 02/14 and comes today for followup of EKG on the flecainide.  Overall he really feels quite well.  He has had no recurrent palpitations and does not think he has had any atrial fibrillation.  He denies edema, orthopnea, PND, natty syncope or palpitations.  He does note some mild lightheadedness when he first stands up.  He denies any too  concerning chest discomfort and overall is doing very well.      EKG today, which I overread, confirms sinus bradycardia at 57 beats per minute.  QRS duration on 50 mg of flecainide was 98 milliseconds.     Outpatient Encounter Prescriptions as of 2/20/2017   Medication Sig Dispense Refill     flecainide (TAMBOCOR) 50 MG tablet Take 1 tablet (50 mg) by mouth 2 times daily 60 tablet 1     ranitidine (ZANTAC) 150 MG tablet Take 1 tablet (150 mg) by mouth once as needed for heartburn       apixaban ANTICOAGULANT (ELIQUIS) 5 MG tablet Take 1 tablet (5 mg) by mouth 2 times daily 60 tablet 1     ATORVASTATIN CALCIUM PO Take 10 mg by mouth daily        aspirin 81 MG tablet Take 81 mg by mouth daily       METOPROLOL SUCCINATE ER PO Take 25 mg by mouth daily        [DISCONTINUED] flecainide (TAMBOCOR) 50 MG tablet Take 1 tablet (50 mg) by mouth 2 times daily 60 tablet 0     No facility-administered encounter medications on file as of 2/20/2017.       ASSESSMENT AND PLAN:     1.  Paroxysmal atrial fibrillation.  As above, we are unsure of the trigger for this.  He does not drink alcohol or excessive caffeine.  His TSH was normal.  He had no evidence of ischemia on stress testing.  He has no family history of atrial fibrillation or other arrhythmias, and left atrial size was normal.  He does report increased stress since December which may have encouraged atrial fibrillation.      Dr. Stout has recommended he continue flecainide and Eliquis at least for 1 month.  I have given him a few more weeks of samples to get him through on Eliquis and sent in a prescription for flecainide.  He will continue his other medications including metoprolol succinate 25 mg daily and statin therapy.      He sees Dr. Stout on 03/21.  At that time we will determine if and when he can come off of his antiarrhythmic and anticoagulant.      It has been a pleasure to see Jordan in clinic today.  I have made no medication changes but did send in a  refill for his flecainide therapy.     Sincerely,    Osirsi Batista PA-C     [facility]

## 2017-02-21 NOTE — PROGRESS NOTES
HISTORY OF PRESENT ILLNESS:  I had the pleasure of seeing Mr. Warner today when he came accompanied by his wife and  for followup of his recent hospitalization.  He is a very pleasant 51-year-old.  On 02/05 he presented to [facility] complaining of significant palpitations.  He was noted to be in atrial fibrillation with a heart rate of over 120 beats per minute.  He was started on diltiazem and underwent DC cardioversion.  He was started on metoprolol and aspirin.  He was instructed to follow up; however, before he could do so, he re-presented to [facility] on 02/13 with recurrent palpitations.  He was sent to [facility], where he saw Dr. Stout on 02/13.      Dr. Stout noted that he appeared to be in what looked like atrial flutter.  He was started on a diltiazem drip and spontaneously converted to sinus rhythm.  During that hospitalization stress testing was done showing no evidence of ischemia.  Echocardiogram was done showing a normal ejection fraction at 55%-60% and normal left atrial size.  His TSH was normal, and there was no cause of his recurrent atrial fibrillation found.  Once his stress test was found to be normal, he was started on flecainide 50 mg twice daily.  Despite a CHADS-VASc score of 0 (with only prediabetes and borderline hypertension noted), he was also discharged on Eliquis to be continued for roughly 1 month.  He was continued on beta blocker and statin therapy.      He was discharged home on 02/14 and comes today for followup of EKG on the flecainide.  Overall he really feels quite well.  He has had no recurrent palpitations and does not think he has had any atrial fibrillation.  He denies edema, orthopnea, PND, natty syncope or palpitations.  He does note some mild lightheadedness when he first stands up.  He denies any too concerning chest discomfort and overall is doing very well.      EKG today, which I overread, confirms  sinus bradycardia at 57 beats per minute.  QRS duration on 50 mg of flecainide was 98 milliseconds.        ASSESSMENT AND PLAN:     1.  Paroxysmal atrial fibrillation.  As above, we are unsure of the trigger for this.  He does not drink alcohol or excessive caffeine.  His TSH was normal.  He had no evidence of ischemia on stress testing.  He has no family history of atrial fibrillation or other arrhythmias, and left atrial size was normal.  He does report increased stress since December which may have encouraged atrial fibrillation.      Dr. Stout has recommended he continue flecainide and Eliquis at least for 1 month.  I have given him a few more weeks of samples to get him through on Eliquis and sent in a prescription for flecainide.  He will continue his other medications including metoprolol succinate 25 mg daily and statin therapy.      He sees Dr. Stout on .  At that time we will determine if and when he can come off of his antiarrhythmic and anticoagulant.      It has been a pleasure to see Jordan in clinic today.  I have made no medication changes but did send in a refill for his flecainide therapy.         JUDY PRICE PA-C             D: 2017 16:47   T: 2017 00:57   MT: SACHI      Name:     JORDAN TREJO   MRN:      [id number]        Account:      [id number]   :      1965           Service Date: 2017      Document: [id number]

## 2017-03-21 ENCOUNTER — OFFICE VISIT (OUTPATIENT)
Dept: CARDIOLOGY | Facility: CLINIC | Age: 52
End: 2017-03-21

## 2017-03-21 VITALS
WEIGHT: 224 LBS | HEART RATE: 54 BPM | BODY MASS INDEX: 33.95 KG/M2 | DIASTOLIC BLOOD PRESSURE: 58 MMHG | SYSTOLIC BLOOD PRESSURE: 136 MMHG | HEIGHT: 68 IN

## 2017-03-21 DIAGNOSIS — I48.91 ATRIAL FIBRILLATION WITH RAPID VENTRICULAR RESPONSE (H): Primary | ICD-10-CM

## 2017-03-21 PROCEDURE — 93000 ELECTROCARDIOGRAM COMPLETE: CPT | Performed by: INTERNAL MEDICINE

## 2017-03-21 PROCEDURE — 99213 OFFICE O/P EST LOW 20 MIN: CPT | Performed by: INTERNAL MEDICINE

## 2017-03-21 RX ORDER — METOPROLOL SUCCINATE 25 MG/1
12.5 TABLET, EXTENDED RELEASE ORAL DAILY
Qty: 30 TABLET | Refills: 3 | COMMUNITY
Start: 2017-03-21 | End: 2017-10-03

## 2017-03-21 NOTE — LETTER
3/21/2017    [facility]  [address]  Attn: [place] [address]    RE: Jordan Warner       Dear Colleague,    I had the pleasure of seeing Jordan Warner in the [facility].    REASON FOR VISIT:  Evaluation of atrial fibrillation.      Mr. Warner is a pleasant 51-year-old gentleman with history of prediabetes and borderline hypertension who is here for evaluation of atrial fibrillation.      The patient initially presented with intermittent episodes of palpitations and on 02/05/2017 had 1 episode which was more sustained, associated with shortness of breath.  He came to the ED and was found to have atrial fibrillation.  He underwent cardioversion and was started on metoprolol and aspirin.  On 02/13 he had recurrence of palpitations and came back again to the ED and was found to be in atrial flutter.  He was started on diltiazem drip, and finally the tachycardia terminated.      I evaluated him on 02/14.  At that time, he underwent a stress test which was negative for ischemia, and we started him on a combination of metoprolol and flecainide.  Due to recurrent episodes of atrial arrhythmia, we decided to keep him on Eliquis for a month.      At the moment, he is doing well.  He informs that since he left the hospital, he has not had any recurrence of atrial arrhythmias.  He denies any symptoms during this visit such as chest pain or shortness of breath.      He does complain of having episodes of headache.  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      EKG done here today showed normal sinus rhythm, QRS measuring 98 milliseconds.  QTc measuring 403 milliseconds.     Outpatient Encounter Prescriptions as of 3/21/2017   Medication Sig Dispense Refill     metoprolol (TOPROL-XL) 25 MG 24 hr tablet Take 0.5 tablets (12.5 mg) by mouth daily 30 tablet 3     ranitidine (ZANTAC) 150 MG  tablet Take 1 tablet (150 mg) by mouth once as needed for heartburn       [DISCONTINUED] flecainide (TAMBOCOR) 50 MG tablet Take 1 tablet (50 mg) by mouth 2 times daily 60 tablet 1     ATORVASTATIN CALCIUM PO Take 10 mg by mouth daily        aspirin 81 MG tablet Take 81 mg by mouth daily       [DISCONTINUED] apixaban ANTICOAGULANT (ELIQUIS) 5 MG tablet Take 1 tablet (5 mg) by mouth 2 times daily 60 tablet 1     [DISCONTINUED] METOPROLOL SUCCINATE ER PO Take 25 mg by mouth daily        No facility-administered encounter medications on file as of 3/21/2017.       ASSESSMENT AND PLAN:   1.  Paroxysmal atrial fibrillation.  He responded well to combination of flecainide and metoprolol.  Heart rate is somewhat slow today.  Therefore, I recommend decreasing the dose of metoprolol to 12.5 mg.   2.  Embolic prevention.  CHADS-VASc score is 0.  He completed a month of anticoagulation.  I am okay with him discontinuing anticoagulation and continuing just a baby aspirin.   3.  Followup care.  Follow up in clinic here in 6 months or earlier as needed.     Again, thank you for allowing me to participate in the care of your patient.      Sincerely,    Greg Stout MD     [facility]

## 2017-03-21 NOTE — MR AVS SNAPSHOT
"              After Visit Summary   3/21/2017    Jordan Warner    MRN: [id number]           Patient Information     Date Of Birth          1965        Visit Information        Provider Department      3/21/2017 3:30 PM Greg Stout MD; [place] [employer] [facility] [place]        Today's Diagnoses     Atrial fibrillation with rapid ventricular response (H)    -  1       Follow-ups after your visit        Additional Services     Follow-Up with Cardiac Advanced Practice Provider                 Future tests that were ordered for you today     Open Future Orders        Priority Expected Expires Ordered    Follow-Up with Cardiac Advanced Practice Provider Routine 9/17/2017 3/21/2018 3/21/2017    EKG 12-lead complete w/read - Clinics Routine 9/17/2017 3/21/2018 3/21/2017            Who to contact     If you have questions or need follow up information about today's clinic visit or your schedule please contact [facility] [place] directly at [phone].  Normal or non-critical lab and imaging results will be communicated to you by MyChart, letter or phone within 4 business days after the clinic has received the results. If you do not hear from us within 7 days, please contact the clinic through [email or web address] or phone. If you have a critical or abnormal lab result, we will notify you by phone as soon as possible.  Submit refill requests through Tradier or call your pharmacy and they will forward the refill request to us. Please allow 3 business days for your refill to be completed.          Additional Information About Your Visit        MyChart Information     Tradier lets you send messages to your doctor, view your test results, renew your prescriptions, schedule appointments and more. To sign up, go to [email or web address] . Click on \"Log in\" on the left side of the screen, which will take you to the Welcome page. Then click on " "\"Sign up Now\" on the right side of the page.     You will be asked to enter the access code listed below, as well as some personal information. Please follow the directions to create your username and password.     Your access code is: [email or web address]  Expires: 2017  6:46 AM     Your access code will  in 90 days. If you need help or a new code, please call your [place] clinic or [phone].        Care EveryWhere ID     This is your Care EveryWhere ID. This could be used by other organizations to access your [place] medical records  [id number]        Your Vitals Were     Pulse Height BMI (Body Mass Index)             54 1.727 m (5' 8\") 34.06 kg/m2          Blood Pressure from Last 3 Encounters:   17 136/58   17 110/66   17 128/72    Weight from Last 3 Encounters:   17 101.6 kg (224 lb)   17 99.3 kg (219 lb)   17 97.3 kg (214 lb 8.1 oz)              We Performed the Following     EKG 12-lead complete w/read - Clinics (performed today)          Today's Medication Changes          These changes are accurate as of: 3/21/17  4:08 PM.  If you have any questions, ask your nurse or doctor.               These medicines have changed or have updated prescriptions.        Dose/Directions    metoprolol 25 MG 24 hr tablet   Commonly known as:  TOPROL-XL   This may have changed:  Another medication with the same name was removed. Continue taking this medication, and follow the directions you see here.   Used for:  Atrial fibrillation with rapid ventricular response (H)   Changed by:  Greg Stout MD        Dose:  12.5 mg   Take 0.5 tablets (12.5 mg) by mouth daily   Quantity:  30 tablet   Refills:  3         Stop taking these medicines if you haven't already. Please contact your care team if you have questions.     apixaban ANTICOAGULANT 5 MG tablet   Commonly known as:  ELIQUIS   Stopped by:  Greg Stout MD                    Primary Care Provider Fax #    [facility] [phone]       [address] Attn: HIM Dept  [facility] [address]        Thank you!     Thank you for choosing [facility] [place]  for your care. Our goal is always to provide you with excellent care. Hearing back from our patients is one way we can continue to improve our services. Please take a few minutes to complete the written survey that you may receive in the mail after your visit with us. Thank you!             Your Updated Medication List - Protect others around you: Learn how to safely use, store and throw away your medicines at www.disposemymeds.org.          This list is accurate as of: 3/21/17  4:08 PM.  Always use your most recent med list.                   Brand Name Dispense Instructions for use    aspirin 81 MG tablet      Take 81 mg by mouth daily       ATORVASTATIN CALCIUM PO      Take 10 mg by mouth daily       flecainide 50 MG tablet    TAMBOCOR    60 tablet    Take 1 tablet (50 mg) by mouth 2 times daily       metoprolol 25 MG 24 hr tablet    TOPROL-XL    30 tablet    Take 0.5 tablets (12.5 mg) by mouth daily       ZANTAC 150 MG tablet   Generic drug:  ranitidine      Take 1 tablet (150 mg) by mouth once as needed for heartburn

## 2017-03-21 NOTE — PROGRESS NOTES
"HPI and Plan:   See dictation  [id number]    Physical Exam:  Vitals: /58  Pulse 54  Ht 1.727 m (5' 8\")  Wt 101.6 kg (224 lb)  BMI 34.06 kg/m2    Constitutional:  AAO x3.  Pt is in NAD.  HEAD: normocephalic.  SKIN: Skin normal color, texture and turgor with no lesions or eruptions.  Eyes: PERRL, EOMI.  ENT:  Supple, normal JVP. No lymphadenopathy or thyroid enlargement.  Chest:  CTAB.  Cardiac:   RRR, normal  S1 and S2.  No murmurs rubs or gallop.    Abdomen:  Normal BS.  Soft, non-tender and non-distended.  No rebound or guarding.    Extremities:  Pedious pulses palpable B/L.  No LE edema noticed.   Neurological: Strength and sensation grossly symmetric and intact throughout.       CURRENT MEDICATIONS:  Current Outpatient Prescriptions   Medication Sig Dispense Refill     metoprolol (TOPROL-XL) 25 MG 24 hr tablet Take 0.5 tablets (12.5 mg) by mouth daily 30 tablet 3     flecainide (TAMBOCOR) 50 MG tablet Take 1 tablet (50 mg) by mouth 2 times daily 60 tablet 1     ranitidine (ZANTAC) 150 MG tablet Take 1 tablet (150 mg) by mouth once as needed for heartburn       ATORVASTATIN CALCIUM PO Take 10 mg by mouth daily        aspirin 81 MG tablet Take 81 mg by mouth daily       [DISCONTINUED] METOPROLOL SUCCINATE ER PO Take 25 mg by mouth daily          ALLERGIES   No Known Allergies    PAST MEDICAL HISTORY:  Past Medical History   Diagnosis Date     Atrial fibrillation (H) 02/05/2017/returned to ED On 2/13 back in AF     Cardioverted on second attempt in ED     Prediabetes        PAST SURGICAL HISTORY:  History reviewed. No pertinent past surgical history.    FAMILY HISTORY:  History reviewed. No pertinent family history.    SOCIAL HISTORY:  Social History     Social History     Marital status:      Spouse name: N/A     Number of children: N/A     Years of education: N/A     Social History Main Topics     Smoking status: Never Smoker     Smokeless tobacco: None     Alcohol use None     Drug use: None     Sexual " activity: Not Asked     Other Topics Concern     Parent/Sibling W/ Cabg, Mi Or Angioplasty Before 65f 55m? No     Social History Narrative       Review of Systems:  Skin:  Negative     Eyes:  Negative    ENT:  Negative    Respiratory:  Negative for shortness of breath;dyspnea on exertion;cough  Cardiovascular:  Negative Positive for;chest pain  Gastroenterology: Negative for melena;hematochezia  Genitourinary:  Negative    Musculoskeletal:  Negative    Neurologic:  Negative    Psychiatric:  Positive for excessive stress  Heme/Lymph/Imm:  Negative    Endocrine:  Negative        Recent Lab Results:  LIPID RESULTS:  Lab Results   Component Value Date    CHOL 159 02/14/2017    HDL 59 02/14/2017    LDL 75 02/14/2017    TRIG 124 02/14/2017       LIVER ENZYME RESULTS:  Lab Results   Component Value Date    AST 25 02/13/2017    ALT 43 02/13/2017       CBC RESULTS:  Lab Results   Component Value Date    WBC 8.4 02/13/2017    RBC 5.45 02/13/2017    HGB 16.0 02/13/2017    HCT 48.1 02/13/2017    MCV 88 02/13/2017    MCH 29.4 02/13/2017    MCHC 33.3 02/13/2017    RDW 12.3 02/13/2017     02/13/2017       BMP RESULTS:  Lab Results   Component Value Date     02/13/2017    POTASSIUM 3.9 02/13/2017    CHLORIDE 107 02/13/2017    CO2 24 02/13/2017    ANIONGAP 9 02/13/2017     (H) 02/13/2017    BUN 13 02/13/2017    CR 0.85 02/13/2017    GFRESTIMATED >90  Non  GFR Calc   02/13/2017    GFRESTBLACK >90   GFR Calc   02/13/2017    HARSHA 8.7 02/13/2017        A1C RESULTS:  Lab Results   Component Value Date    A1C 6.6 (H) 02/14/2017       INR RESULTS:  No results found for: INR      ECHOCARDIOGRAM  No results found for this or any previous visit (from the past 8760 hour(s)).      Orders Placed This Encounter   Procedures     Follow-Up with Cardiac Advanced Practice Provider     EKG 12-lead complete w/read - Clinics (performed today)     EKG 12-lead complete w/read - Clinics     Orders Placed  This Encounter   Medications     metoprolol (TOPROL-XL) 25 MG 24 hr tablet     Sig: Take 0.5 tablets (12.5 mg) by mouth daily     Dispense:  30 tablet     Refill:  3     Medications Discontinued During This Encounter   Medication Reason     apixaban ANTICOAGULANT (ELIQUIS) 5 MG tablet      METOPROLOL SUCCINATE ER PO          Encounter Diagnosis   Name Primary?     Atrial fibrillation with rapid ventricular response (H) Yes         CC  [facility]  [address] [place] [address]  Attn: [place] [address]

## 2017-03-22 NOTE — PROGRESS NOTES
REASON FOR VISIT:  Evaluation of atrial fibrillation.      HISTORY OF PRESENT ILLNESS:  Mr. Warner is a pleasant 51-year-old gentleman with history of prediabetes and borderline hypertension who is here for evaluation of atrial fibrillation.      The patient initially presented with intermittent episodes of palpitations and on 02/05/2017 had 1 episode which was more sustained, associated with shortness of breath.  He came to the ED and was found to have atrial fibrillation.  He underwent cardioversion and was started on metoprolol and aspirin.  On 02/13 he had recurrence of palpitations and came back again to the ED and was found to be in atrial flutter.  He was started on diltiazem drip, and finally the tachycardia terminated.      I evaluated him on 02/14.  At that time, he underwent a stress test which was negative for ischemia, and we started him on a combination of metoprolol and flecainide.  Due to recurrent episodes of atrial arrhythmia, we decided to keep him on Eliquis for a month.      At the moment, he is doing well.  He informs that since he left the hospital, he has not had any recurrence of atrial arrhythmias.  He denies any symptoms during this visit such as chest pain or shortness of breath.      He does complain of having episodes of headache.  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      EKG done here today showed normal sinus rhythm, QRS measuring 98 milliseconds.  QTc measuring 403 milliseconds.      ASSESSMENT AND PLAN:   1.  Paroxysmal atrial fibrillation.  He responded well to combination of flecainide and metoprolol.  Heart rate is somewhat slow today.  Therefore, I recommend decreasing the dose of metoprolol to 12.5 mg.   2.  Embolic prevention.  CHADS-VASc score is 0.  He completed a month of anticoagulation.  I am okay with him discontinuing anticoagulation and continuing just a baby aspirin.   3.   Followup care.  Follow up in clinic here in 6 months or earlier as needed.         GRADY RAMIREZ MD             D: 2017 16:11   T: 2017 13:08   MT: SACHI      Name:     AN TREJO   MRN:      [id number]        Account:      [id number]   :      1965           Service Date: 2017      Document: [id number]

## 2017-04-05 ENCOUNTER — CARE COORDINATION (OUTPATIENT)
Dept: CARDIOLOGY | Facility: CLINIC | Age: 52
End: 2017-04-05

## 2017-04-05 DIAGNOSIS — I48.91 ATRIAL FIBRILLATION WITH RAPID VENTRICULAR RESPONSE (H): ICD-10-CM

## 2017-04-05 RX ORDER — FLECAINIDE ACETATE 50 MG/1
50 TABLET ORAL 2 TIMES DAILY
Qty: 180 TABLET | Refills: 3 | Status: SHIPPED | OUTPATIENT
Start: 2017-04-05 | End: 2017-10-03

## 2017-04-05 NOTE — PROGRESS NOTES
Pt's wife calling to request Rx refill of Flecainide 50 mg BID. Rx filled for #90 days with 3 refills. Last seen by Dr. Stout's on 3/21/17, advised to continue Flecainide. ALPESH Milian.

## 2017-10-03 ENCOUNTER — OFFICE VISIT (OUTPATIENT)
Dept: CARDIOLOGY | Facility: CLINIC | Age: 52
End: 2017-10-03
Attending: INTERNAL MEDICINE

## 2017-10-03 VITALS
HEART RATE: 59 BPM | DIASTOLIC BLOOD PRESSURE: 72 MMHG | HEIGHT: 68 IN | WEIGHT: 212 LBS | SYSTOLIC BLOOD PRESSURE: 132 MMHG | BODY MASS INDEX: 32.13 KG/M2

## 2017-10-03 DIAGNOSIS — I48.91 ATRIAL FIBRILLATION WITH RAPID VENTRICULAR RESPONSE (H): Primary | ICD-10-CM

## 2017-10-03 DIAGNOSIS — E78.2 MIXED HYPERLIPIDEMIA: ICD-10-CM

## 2017-10-03 PROCEDURE — 99212 OFFICE O/P EST SF 10 MIN: CPT | Performed by: PHYSICIAN ASSISTANT

## 2017-10-03 PROCEDURE — 93000 ELECTROCARDIOGRAM COMPLETE: CPT | Performed by: PHYSICIAN ASSISTANT

## 2017-10-03 RX ORDER — METOPROLOL SUCCINATE 25 MG/1
12.5 TABLET, EXTENDED RELEASE ORAL DAILY
Qty: 45 TABLET | Refills: 3 | Status: SHIPPED | OUTPATIENT
Start: 2017-10-03 | End: 2018-11-16

## 2017-10-03 RX ORDER — FLECAINIDE ACETATE 50 MG/1
50 TABLET ORAL 2 TIMES DAILY
Qty: 180 TABLET | Refills: 3 | Status: SHIPPED | OUTPATIENT
Start: 2017-10-03 | End: 2018-11-16

## 2017-10-03 NOTE — PATIENT INSTRUCTIONS
1. EKG today looked great! Normal rhythm    2. Continue current medications for now. Flecainide 50 mg TWO TIMES PER DAY.  Metoprolol 12.5 mg (1/2 of a 25 mg tablet) ONE TIME PER DAY.      3. Continue aspirin and cholesterol medication atorvastatin     4. See Dr. Stout in 1 year but CALL if need to be seen sooner!  Our AFib nurses are Stephie & Pat: [phone]    5. Get cholesterol checked (Fasting blood work) with Patria Duran and they will refill the atorvastatin (cholesterol medication)

## 2017-10-03 NOTE — LETTER
10/3/2017    [facility]  [address] [facility] Attn: Him Dept  [facility] [address]    RE: Jordan Timmy       Dear Colleague,    I had the pleasure of seeing Jordan Warner in the [facility].    HPI:   I had the pleasure of seeing Jordan today when he came for follow-up of his atrial fibrillation. He is a pleasant 52-year-old man with a history of prediabetes and borderline hypertension who presented initially 2/5/17 due to concerns regarding prolonged palpitations associated with shortness of breath. He underwent DC cardioversion after being found to be in atrial fibrillation and was started on beta blocker and aspirin therapy. One week later, he had recurrent palpitations and was actually noted to be in atrial flutter. A stress test at that time was negative, and he was started on metoprolol and flecainide.     He saw Dr. Stout 3/2017 and at that time, Eliquis was switched to aspirin. He had continued to respond well to a combination of flecainide and metoprolol and for a low heart rate, Dr. Stout decreased his metoprolol succinate to one half tablet daily. He is now back for 6 month follow-up.    He actually tells me that he's only been taking flecainide 50 mg once daily and continue metoprolol 25 daily. With this, he does not think he's had any recurrent arrhythmias, specifically denying chest pain, pressure, palpitations, lightheadedness, dizziness or edema. He's really feeling quite good. He is working on losing weight, and is down about 12 pounds in the last 6 months. He is trying to cut back on his simple, processed foods.    Stress test 2/2017 showed no ischemia.   Echocardiogram 2/2017 showed EF 55-60%  EKG today, which I overread, showed SR @ 61 bpm with QRS duration 102 ms. this is while on flecainide 50 mg just once daily.      Assessment & Plan:    1. Paroxysmal Atrial Fibrillation - he remains in sinus rhythm by exam and EKG today. He is tolerating flecainide without  issues, but I would like him to be on the correct dose, and we'll have him and increase this to 50 mg twice daily. I will have him back down to metoprolol succinate 12.5 mg daily as Dr. Stout had intended. Prescriptions were sent in with the correct dosing instructions on them.     He will remain on aspirin 81 mg daily for his CHADSVASc score of 0.    2. Borderline DM and dyslipidemia- we had another long discussion about foods that he should avoid. I have recommended he continue to avoid processed, highly sugared food. He will see his primary care providers and recheck his lipid panel now that he's been started on atorvastatin.    He will see Dr. Stout in one year, but will call us in the interim with any questions, issues or recurrent palpitations.      Rosalba Batista PA-C, [facility] [[email or web address]]AS      Orders Placed This Encounter   Procedures     Follow-Up with Electrophysiologist     EKG 12-lead complete w/read - Clinics (performed today)     EKG 12-lead complete w/read - Clinics (to be scheduled)     Orders Placed This Encounter   Medications     flecainide (TAMBOCOR) 50 MG tablet     Sig: Take 1 tablet (50 mg) by mouth 2 times daily     Dispense:  180 tablet     Refill:  3     metoprolol (TOPROL-XL) 25 MG 24 hr tablet     Sig: Take 0.5 tablets (12.5 mg) by mouth daily     Dispense:  45 tablet     Refill:  3     Medications Discontinued During This Encounter   Medication Reason     ranitidine (ZANTAC) 150 MG tablet Therapy completed     flecainide (TAMBOCOR) 50 MG tablet Reorder     metoprolol (TOPROL-XL) 25 MG 24 hr tablet Reorder         Encounter Diagnosis   Name Primary?     Atrial fibrillation with rapid ventricular response (H) Yes       CURRENT MEDICATIONS:  Current Outpatient Prescriptions   Medication Sig Dispense Refill     flecainide (TAMBOCOR) 50 MG tablet Take 1 tablet (50 mg) by mouth 2 times daily 180 tablet 3     metoprolol (TOPROL-XL) 25 MG 24 hr tablet Take 0.5 tablets (12.5 mg) by mouth daily 45 tablet 3      "ATORVASTATIN CALCIUM PO Take 10 mg by mouth daily        aspirin 81 MG tablet Take 81 mg by mouth daily       [DISCONTINUED] flecainide (TAMBOCOR) 50 MG tablet Take 1 tablet (50 mg) by mouth 2 times daily 180 tablet 3     [DISCONTINUED] metoprolol (TOPROL-XL) 25 MG 24 hr tablet Take 0.5 tablets (12.5 mg) by mouth daily 30 tablet 3       ALLERGIES   No Known Allergies    PAST MEDICAL HISTORY:  Past Medical History:   Diagnosis Date     Atrial fibrillation (H) 02/05/2017/returned to ED On 2/13 back in AF    Cardioverted on second attempt in ED     Prediabetes        PAST SURGICAL HISTORY:  History reviewed. No pertinent surgical history.    FAMILY HISTORY:  Family History   Problem Relation Age of Onset     Unknown/Adopted Father      Appendicitis       SOCIAL HISTORY:  Social History     Social History     Marital status:      Spouse name: N/A     Number of children: N/A     Years of education: N/A     Social History Main Topics     Smoking status: Never Smoker     Smokeless tobacco: Never Used     Alcohol use Yes      Comment: Socially     Drug use: None     Sexual activity: Not Asked     Other Topics Concern     Parent/Sibling W/ Cabg, Mi Or Angioplasty Before 65f 55m? No     Social History Narrative       Review of Systems:  Skin:  Negative     Eyes:  Negative    ENT:  Negative    Respiratory:  Negative for shortness of breath;dyspnea on exertion;cough  Cardiovascular:  Negative for;palpitations;chest pain;edema;syncope or near-syncope    Gastroenterology: Negative for melena;hematochezia  Genitourinary:  Negative    Musculoskeletal:  Negative    Neurologic:  Negative    Psychiatric:  Positive for excessive stress  Heme/Lymph/Imm:  Negative    Endocrine:  Negative      Physical Exam:  Vitals: /72  Pulse 59  Ht 1.727 m (5' 8\")  Wt 96.2 kg (212 lb)  BMI 32.23 kg/m2    Constitutional:  cooperative, alert and oriented, well developed, well nourished, in no acute distress        Skin:  warm and dry to " the touch, no apparent skin lesions or masses noted        Head:  normocephalic, no masses or lesions        Eyes:  pupils equal and round;sclera white;conjunctivae and lids unremarkable;no xanthalasma        ENT:  no pallor or cyanosis, dentition good        Neck:  no carotid bruit;JVP normal        Chest:  normal breath sounds, clear to auscultation, normal A-P diameter, normal symmetry, normal respiratory excursion, no use of accessory muscles        Cardiac: regular rhythm, normal S1/S2, no S3 or S4, apical impulse not displaced, no murmurs, gallops or rubs                  Abdomen:  abdomen soft        Vascular: pulses full and equal                                      Extremities and Back:  no deformities, clubbing, cyanosis, erythema observed;no edema        Neurological:  affect appropriate, oriented to time, person and place          Recent Lab Results:  LIPID RESULTS:  Lab Results   Component Value Date    CHOL 159 02/14/2017    HDL 59 02/14/2017    LDL 75 02/14/2017    TRIG 124 02/14/2017       LIVER ENZYME RESULTS:  Lab Results   Component Value Date    AST 25 02/13/2017    ALT 43 02/13/2017       CBC RESULTS:  Lab Results   Component Value Date    WBC 8.4 02/13/2017    RBC 5.45 02/13/2017    HGB 16.0 02/13/2017    HCT 48.1 02/13/2017    MCV 88 02/13/2017    MCH 29.4 02/13/2017    MCHC 33.3 02/13/2017    RDW 12.3 02/13/2017     02/13/2017       BMP RESULTS:  Lab Results   Component Value Date     02/13/2017    POTASSIUM 3.9 02/13/2017    CHLORIDE 107 02/13/2017    CO2 24 02/13/2017    ANIONGAP 9 02/13/2017     (H) 02/13/2017    BUN 13 02/13/2017    CR 0.85 02/13/2017    GFRESTIMATED >90  Non  GFR Calc   02/13/2017    GFRESTBLACK >90   GFR Calc   02/13/2017    HARSHA 8.7 02/13/2017        A1C RESULTS:  Lab Results   Component Value Date    A1C 6.6 (H) 02/14/2017       INR RESULTS:  No results found for: INR    Thank you for allowing me to participate in  the care of your patient.    Sincerely,     Osiris Batista PA-C     [facility]

## 2017-10-03 NOTE — MR AVS SNAPSHOT
After Visit Summary   10/3/2017    Jordan Warner    MRN: [id number]           Patient Information     Date Of Birth          1965        Visit Information        Provider Department      10/3/2017 2:45 PM Osiris Batista PA-C; [employer] [facility]        Today's Diagnoses     Atrial fibrillation with rapid ventricular response (H)    -  1      Care Instructions    1. EKG today looked great! Normal rhythm    2. Continue current medications for now. Flecainide 50 mg TWO TIMES PER DAY.  Metoprolol 12.5 mg (1/2 of a 25 mg tablet) ONE TIME PER DAY.      3. Continue aspirin and cholesterol medication atorvastatin     4. See Dr. Stout in 1 year but CALL if need to be seen sooner!  Our AFib nurses are Stephie & Pat: [phone]    5. Get cholesterol checked (Fasting blood work) with Patria Duran and they will refill the atorvastatin (cholesterol medication)            Follow-ups after your visit        Additional Services     Follow-Up with Electrophysiologist                 Future tests that were ordered for you today     Open Future Orders        Priority Expected Expires Ordered    EKG 12-lead complete w/read - Clinics (to be scheduled) Routine 10/3/2018 10/4/2018 10/3/2017    Follow-Up with Electrophysiologist Routine 10/3/2018 10/4/2018 10/3/2017            Who to contact     If you have questions or need follow up information about today's clinic visit or your schedule please contact [facility] directly at [phone].  Normal or non-critical lab and imaging results will be communicated to you by MyChart, letter or phone within 4 business days after the clinic has received the results. If you do not hear from us within 7 days, please contact the clinic through MyChart or phone. If you have a critical or abnormal lab result, we will notify you by phone as soon as possible.  Submit refill  "requests through [facility] or call your pharmacy and they will forward the refill request to us. Please allow 3 business days for your refill to be completed.          Additional Information About Your Visit        LRNhart Information     [facility] Your [facility] lets you send messages to your doctor, view your test results, renew your prescriptions, schedule appointments and more. To sign up, go to [email or web address] Your [facility] . Click on \"Log in\" on the left side of the screen, which will take you to the Welcome page. Then click on \"Sign up Now\" on the right side of the page.     You will be asked to enter the access code listed below, as well as some personal information. Please follow the directions to create your username and password.     Your access code is: [email or web address]  Expires: 2018  3:38 PM     Your access code will  in 90 days. If you need help or a new code, please call your [place] clinic or [phone].        Care EveryWhere ID     This is your Care EveryWhere ID. This could be used by other organizations to access your [place] medical records  [id number]        Your Vitals Were     Pulse Height BMI (Body Mass Index)             59 1.727 m (5' 8\") 32.23 kg/m2          Blood Pressure from Last 3 Encounters:   10/03/17 132/72   17 136/58   17 110/66    Weight from Last 3 Encounters:   10/03/17 96.2 kg (212 lb)   17 101.6 kg (224 lb)   17 99.3 kg (219 lb)              We Performed the Following     EKG 12-lead complete w/read - Clinics (performed today)     EKG 12-lead complete w/read - Clinics     Follow-Up with Cardiac Advanced Practice Provider          Where to get your medicines      These medications were sent to [facility] - [address] [address]     Phone:  [phone]     flecainide 50 MG tablet    metoprolol 25 MG 24 hr tablet          Primary Care Provider Fax #    Patria Duran [phone]       [address] Attn: HIM Dept  [facility] [address]        Equal Access to Services     MAMADOUMONICA MAYA : Hadii leanna altamirano hadpollyo Sojasenali, waaxda luqadaha, qaybta kaalmaraisa mccracken, janie quesadabryceharsha bey. So [facility] [phone].    ATENCIÓN: Si habla español, tiene a bailey disposición servicios gratuitos de asistencia lingüística. Llame al [phone].    We comply with applicable federal civil rights laws and [place] laws. We do not discriminate on the basis of race, color, national origin, age, disability, sex, sexual orientation, or gender identity.            Thank you!     Thank you for choosing [facility] [place]  for your care. Our goal is always to provide you with excellent care. Hearing back from our patients is one way we can continue to improve our services. Please take a few minutes to complete the written survey that you may receive in the mail after your visit with us. Thank you!             Your Updated Medication List - Protect others around you: Learn how to safely use, store and throw away your medicines at www.disposemymeds.org.          This list is accurate as of: 10/3/17  3:44 PM.  Always use your most recent med list.                   Brand Name Dispense Instructions for use Diagnosis    aspirin 81 MG tablet      Take 81 mg by mouth daily        ATORVASTATIN CALCIUM PO      Take 10 mg by mouth daily        flecainide 50 MG tablet    TAMBOCOR    180 tablet    Take 1 tablet (50 mg) by mouth 2 times daily    Atrial fibrillation with rapid ventricular response (H)       metoprolol 25 MG 24 hr tablet    TOPROL-XL    45 tablet    Take 0.5 tablets (12.5 mg) by mouth daily    Atrial fibrillation with rapid ventricular response (H)

## 2017-10-03 NOTE — PROGRESS NOTES
HPI:   I had the pleasure of seeing Jordan today when he came for follow-up of his atrial fibrillation. He is a pleasant 52-year-old man with a history of prediabetes and borderline hypertension who presented initially 2/5/17 due to concerns regarding prolonged palpitations associated with shortness of breath. He underwent DC cardioversion after being found to be in atrial fibrillation and was started on beta blocker and aspirin therapy. One week later, he had recurrent palpitations and was actually noted to be in atrial flutter. A stress test at that time was negative, and he was started on metoprolol and flecainide.     He saw Dr. Stout 3/2017 and at that time, Eliquis was switched to aspirin. He had continued to respond well to a combination of flecainide and metoprolol and for a low heart rate, Dr. Stout decreased his metoprolol succinate to one half tablet daily. He is now back for 6 month follow-up.    He actually tells me that he's only been taking flecainide 50 mg once daily and continue metoprolol 25 daily. With this, he does not think he's had any recurrent arrhythmias, specifically denying chest pain, pressure, palpitations, lightheadedness, dizziness or edema. He's really feeling quite good. He is working on losing weight, and is down about 12 pounds in the last 6 months. He is trying to cut back on his simple, processed foods.    Stress test 2/2017 showed no ischemia.   Echocardiogram 2/2017 showed EF 55-60%  EKG today, which I overread, showed SR @ 61 bpm with QRS duration 102 ms. this is while on flecainide 50 mg just once daily.      Assessment & Plan:    1. Paroxysmal Atrial Fibrillation - he remains in sinus rhythm by exam and EKG today. He is tolerating flecainide without issues, but I would like him to be on the correct dose, and we'll have him and increase this to 50 mg twice daily. I will have him back down to metoprolol succinate 12.5 mg daily as Dr. Stout had intended. Prescriptions were sent  in with the correct dosing instructions on them.     He will remain on aspirin 81 mg daily for his CHADSVASc score of 0.    2. Borderline DM and dyslipidemia- we had another long discussion about foods that he should avoid. I have recommended he continue to avoid processed, highly sugared food. He will see his primary care providers and recheck his lipid panel now that he's been started on atorvastatin.    He will see Dr. Stout in one year, but will call us in the interim with any questions, issues or recurrent palpitations.      Rosalba Batista PA-C, MSPAS      Orders Placed This Encounter   Procedures     Follow-Up with Electrophysiologist     EKG 12-lead complete w/read - Clinics (performed today)     EKG 12-lead complete w/read - Clinics (to be scheduled)     Orders Placed This Encounter   Medications     flecainide (TAMBOCOR) 50 MG tablet     Sig: Take 1 tablet (50 mg) by mouth 2 times daily     Dispense:  180 tablet     Refill:  3     metoprolol (TOPROL-XL) 25 MG 24 hr tablet     Sig: Take 0.5 tablets (12.5 mg) by mouth daily     Dispense:  45 tablet     Refill:  3     Medications Discontinued During This Encounter   Medication Reason     ranitidine (ZANTAC) 150 MG tablet Therapy completed     flecainide (TAMBOCOR) 50 MG tablet Reorder     metoprolol (TOPROL-XL) 25 MG 24 hr tablet Reorder         Encounter Diagnosis   Name Primary?     Atrial fibrillation with rapid ventricular response (H) Yes       CURRENT MEDICATIONS:  Current Outpatient Prescriptions   Medication Sig Dispense Refill     flecainide (TAMBOCOR) 50 MG tablet Take 1 tablet (50 mg) by mouth 2 times daily 180 tablet 3     metoprolol (TOPROL-XL) 25 MG 24 hr tablet Take 0.5 tablets (12.5 mg) by mouth daily 45 tablet 3     ATORVASTATIN CALCIUM PO Take 10 mg by mouth daily        aspirin 81 MG tablet Take 81 mg by mouth daily       [DISCONTINUED] flecainide (TAMBOCOR) 50 MG tablet Take 1 tablet (50 mg) by mouth 2 times daily 180 tablet 3      "[DISCONTINUED] metoprolol (TOPROL-XL) 25 MG 24 hr tablet Take 0.5 tablets (12.5 mg) by mouth daily 30 tablet 3       ALLERGIES   No Known Allergies    PAST MEDICAL HISTORY:  Past Medical History:   Diagnosis Date     Atrial fibrillation (H) 02/05/2017/returned to ED On 2/13 back in AF    Cardioverted on second attempt in ED     Prediabetes        PAST SURGICAL HISTORY:  History reviewed. No pertinent surgical history.    FAMILY HISTORY:  Family History   Problem Relation Age of Onset     Unknown/Adopted Father      Appendicitis       SOCIAL HISTORY:  Social History     Social History     Marital status:      Spouse name: N/A     Number of children: N/A     Years of education: N/A     Social History Main Topics     Smoking status: Never Smoker     Smokeless tobacco: Never Used     Alcohol use Yes      Comment: Socially     Drug use: None     Sexual activity: Not Asked     Other Topics Concern     Parent/Sibling W/ Cabg, Mi Or Angioplasty Before 65f 55m? No     Social History Narrative       Review of Systems:  Skin:  Negative     Eyes:  Negative    ENT:  Negative    Respiratory:  Negative for shortness of breath;dyspnea on exertion;cough  Cardiovascular:  Negative for;palpitations;chest pain;edema;syncope or near-syncope    Gastroenterology: Negative for melena;hematochezia  Genitourinary:  Negative    Musculoskeletal:  Negative    Neurologic:  Negative    Psychiatric:  Positive for excessive stress  Heme/Lymph/Imm:  Negative    Endocrine:  Negative      Physical Exam:  Vitals: /72  Pulse 59  Ht 1.727 m (5' 8\")  Wt 96.2 kg (212 lb)  BMI 32.23 kg/m2    Constitutional:  cooperative, alert and oriented, well developed, well nourished, in no acute distress        Skin:  warm and dry to the touch, no apparent skin lesions or masses noted        Head:  normocephalic, no masses or lesions        Eyes:  pupils equal and round;sclera white;conjunctivae and lids unremarkable;no xanthalasma        ENT:  no " pallor or cyanosis, dentition good        Neck:  no carotid bruit;JVP normal        Chest:  normal breath sounds, clear to auscultation, normal A-P diameter, normal symmetry, normal respiratory excursion, no use of accessory muscles        Cardiac: regular rhythm, normal S1/S2, no S3 or S4, apical impulse not displaced, no murmurs, gallops or rubs                  Abdomen:  abdomen soft        Vascular: pulses full and equal                                      Extremities and Back:  no deformities, clubbing, cyanosis, erythema observed;no edema        Neurological:  affect appropriate, oriented to time, person and place          Recent Lab Results:  LIPID RESULTS:  Lab Results   Component Value Date    CHOL 159 02/14/2017    HDL 59 02/14/2017    LDL 75 02/14/2017    TRIG 124 02/14/2017       LIVER ENZYME RESULTS:  Lab Results   Component Value Date    AST 25 02/13/2017    ALT 43 02/13/2017       CBC RESULTS:  Lab Results   Component Value Date    WBC 8.4 02/13/2017    RBC 5.45 02/13/2017    HGB 16.0 02/13/2017    HCT 48.1 02/13/2017    MCV 88 02/13/2017    MCH 29.4 02/13/2017    MCHC 33.3 02/13/2017    RDW 12.3 02/13/2017     02/13/2017       BMP RESULTS:  Lab Results   Component Value Date     02/13/2017    POTASSIUM 3.9 02/13/2017    CHLORIDE 107 02/13/2017    CO2 24 02/13/2017    ANIONGAP 9 02/13/2017     (H) 02/13/2017    BUN 13 02/13/2017    CR 0.85 02/13/2017    GFRESTIMATED >90  Non  GFR Calc   02/13/2017    GFRESTBLACK >90   GFR Calc   02/13/2017    HARSHA 8.7 02/13/2017        A1C RESULTS:  Lab Results   Component Value Date    A1C 6.6 (H) 02/14/2017       INR RESULTS:  No results found for: INR

## 2018-08-19 ENCOUNTER — HOSPITAL ENCOUNTER (EMERGENCY)
Facility: CLINIC | Age: 53
Discharge: HOME | End: 2018-08-20
Attending: EMERGENCY MEDICINE | Admitting: EMERGENCY MEDICINE
Payer: COMMERCIAL

## 2018-08-19 DIAGNOSIS — M54.2 NECK PAIN: ICD-10-CM
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
## 2018-08-19 DIAGNOSIS — M54.50 ACUTE MIDLINE LOW BACK PAIN WITHOUT SCIATICA: ICD-10-CM

## 2018-08-19 PROCEDURE — 99285 EMERGENCY DEPT VISIT HI MDM: CPT | Mod: 25

## 2018-08-19 PROCEDURE — 96375 TX/PRO/DX INJ NEW DRUG ADDON: CPT

## 2018-08-19 PROCEDURE — 96374 THER/PROPH/DIAG INJ IV PUSH: CPT

## 2018-08-19 PROCEDURE — 93005 ELECTROCARDIOGRAM TRACING: CPT

## 2018-08-19 RX ORDER — MORPHINE SULFATE 4 MG/ML
4 INJECTION, SOLUTION INTRAMUSCULAR; INTRAVENOUS
Status: COMPLETED | OUTPATIENT
Start: 2018-08-19 | End: 2018-08-20

## 2018-08-19 RX ORDER — KETOROLAC TROMETHAMINE 30 MG/ML
30 INJECTION, SOLUTION INTRAMUSCULAR; INTRAVENOUS ONCE
Status: COMPLETED | OUTPATIENT
Start: 2018-08-19 | End: 2018-08-20

## 2018-08-19 RX ORDER — ACETAMINOPHEN 325 MG/1
975 TABLET ORAL ONCE
Status: DISCONTINUED | OUTPATIENT
Start: 2018-08-19 | End: 2018-08-19

## 2018-08-19 RX ORDER — ONDANSETRON 2 MG/ML
4 INJECTION INTRAMUSCULAR; INTRAVENOUS EVERY 30 MIN PRN
Status: DISCONTINUED | OUTPATIENT
Start: 2018-08-19 | End: 2018-08-20 | Stop reason: HOSPADM

## 2018-08-19 RX ORDER — METHOCARBAMOL 750 MG/1
750 TABLET, FILM COATED ORAL ONCE
Status: DISCONTINUED | OUTPATIENT
Start: 2018-08-19 | End: 2018-08-19

## 2018-08-19 NOTE — ED AVS SNAPSHOT
[facility]    [address]    [facility] [address]    Phone:  [phone]    Fax:  [phone]                                       Jordan Warner   MRN: [id number]    Department:  [facility]   Date of Visit:  8/19/2018           After Visit Summary Signature Page     I have received my discharge instructions, and my questions have been answered. I have discussed any challenges I see with this plan with the nurse or doctor.    ..........................................................................................................................................  Patient/Patient Representative Signature      ..........................................................................................................................................  Patient Representative Print Name and Relationship to Patient    ..................................................               ................................................  Date                                            Time    ..........................................................................................................................................  Reviewed by Signature/Title    ...................................................              ..............................................  Date                                                            Time
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
## 2018-08-20 ENCOUNTER — APPOINTMENT (OUTPATIENT)
Dept: GENERAL RADIOLOGY | Facility: CLINIC | Age: 53
End: 2018-08-20
Attending: PHYSICIAN ASSISTANT
Payer: COMMERCIAL

## 2018-08-20 ENCOUNTER — APPOINTMENT (OUTPATIENT)
Dept: CT IMAGING | Facility: CLINIC | Age: 53
End: 2018-08-20
Attending: PHYSICIAN ASSISTANT
Payer: COMMERCIAL

## 2018-08-20 VITALS
DIASTOLIC BLOOD PRESSURE: 76 MMHG | RESPIRATION RATE: 16 BRPM | BODY MASS INDEX: 31.07 KG/M2 | HEIGHT: 68 IN | WEIGHT: 205 LBS | SYSTOLIC BLOOD PRESSURE: 142 MMHG | HEART RATE: 54 BPM | OXYGEN SATURATION: 91 % | TEMPERATURE: 97.8 F

## 2018-08-20 LAB — INTERPRETATION ECG - MUSE: NORMAL

## 2018-08-20 PROCEDURE — 72125 CT NECK SPINE W/O DYE: CPT

## 2018-08-20 PROCEDURE — 72100 X-RAY EXAM L-S SPINE 2/3 VWS: CPT

## 2018-08-20 PROCEDURE — 25000128 H RX IP 250 OP 636: Performed by: PHYSICIAN ASSISTANT

## 2018-08-20 PROCEDURE — 72072 X-RAY EXAM THORAC SPINE 3VWS: CPT

## 2018-08-20 RX ORDER — METHOCARBAMOL 500 MG/1
1000 TABLET, FILM COATED ORAL 3 TIMES DAILY PRN
Qty: 30 TABLET | Refills: 1 | Status: SHIPPED | OUTPATIENT
Start: 2018-08-20

## 2018-08-20 RX ADMIN — MORPHINE SULFATE 4 MG: 4 INJECTION INTRAVENOUS at 00:05

## 2018-08-20 RX ADMIN — KETOROLAC TROMETHAMINE 30 MG: 30 INJECTION, SOLUTION INTRAMUSCULAR at 00:01

## 2018-08-20 RX ADMIN — ONDANSETRON 4 MG: 2 INJECTION INTRAMUSCULAR; INTRAVENOUS at 00:10

## 2018-08-20 ASSESSMENT — ENCOUNTER SYMPTOMS
SHORTNESS OF BREATH: 0
HEMATURIA: 0
CHILLS: 0
MYALGIAS: 1
JOINT SWELLING: 0
WEAKNESS: 0
COUGH: 0
NUMBNESS: 0
BACK PAIN: 1
HEADACHES: 0
ABDOMINAL PAIN: 0
PALPITATIONS: 0
NECK PAIN: 1
FEVER: 0
LIGHT-HEADEDNESS: 0
FLANK PAIN: 0
DIZZINESS: 0
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
## 2018-08-20 NOTE — DISCHARGE INSTRUCTIONS
Dolor De Jeremy O De Espalda [Neck/Back Pain, General]    Tanto el dolor de jeremy sana el de espalda suelen ser consecuencia de adam lesión en los músculos o ligamentos de la columna vertebral. Algunas veces los discos que separan cada hueso de la columna pueden producir dolor al ejercer presión sobre el nervio que está al lado. El dolor de jeremy y de espalda puede aparecer a consecuencia de adam fuerza que produce torsión repentina (sana en un accidente de automóvil) o de un movimiento inhabitual. En ambos casos, es frecuente que el problema vaya acompañado por espasmos musculares que contribuyen al dolor.  El dolor justin de jeremy y de espalda suele mejorar en adam o dos semanas. El dolor relacionado con los trastornos de los discos, la artritis en las articulaciones vertebrales o la estenosis (estrechamiento) del canal medular pueden convertirse en problemas crónicos y durar meses o años.  Cuidados En [place]:  1. PARA EL DOLOR DE JEREMY: Use adam almohada cómoda que dé soporte a la rachel y mantenga la columna en adam posición neutral. La rachel no debe estar inclinada hacia adelante ni hacia atrás.  PARA EL DOLOR DE ESPALDA: Es posible que deba permanecer en la cama los primeros días. Guy tan pronto pueda, debe empezar a sentarse o caminar para evitar los problemas asociados con el descanso prolongado en la cama (debilidad muscular, empeoramiento de la rigidez y el dolor en la espalda, y coágulos sanguíneos en las piernas).  2. Cuando esté en la cama, trate de encontrar adam posición cómoda. Es aconsejable que use un colchón firme. Intente acostarse boca arriba con almohadas debajo de las rodillas. También puede intentar acostarse de lado con las rodillas dobladas hacia el pecho y adam almohada entre las rodillas.  3. Evite permanecer sentado celine períodos largos, ya que esto implica más esfuerzo en la parte inferior de la espalda que cuando está de pié o caminando.  4. Algunas personas encuentran alivio con  la aplicación de calor (ducha o baño caliente, o adam compresa caliente) y masajes, mientras que otras prefieren aplicar frío (hielo virgie o en cubos dentro de adam bolsa envuelta en adam toalla). Pruebe ambos métodos y use el que le dé mejor resultado celine 20 minutos varias veces al día.  5. Puede usar acetaminofén (Tylenol) o ibuprofeno (Motrin o Advil) para controlar el dolor, a menos que le hayan recetado otro medicamento. [NOTA: Si tiene adam enfermedad hepática o renal crónica, o ha tenido alguna vez adam úlcera estomacal o sangrado gastrointestinal, consulte con bailey médico antes de carolina estos medicamentos.]  6. Aprenda las técnicas adecuadas para levantar objetos y no levante nada que pese más de 15 libras hasta que el dolor haya desaparecido por completo.  Programe adam VISITA DE CONTROL con bailey médico o con amparo centro si los síntomas no empiezan a mejorar después de adam semana. Es posible que necesite fisioterapia o pruebas adicionales.  [NOTA: Si le hicieron radiografías, éstas serán examinadas por un radiólogo y le informarán de los nuevos hallazgos que puedan afectar la atención médica que necesita.]  Busque Prontamente Atención Médica  si algo de lo siguiente ocurre:    El dolor empeora o se extiende a los brazos o a las piernas    Debilidad, entumecimiento o dolor en flavia o ambos brazos o piernas    Pérdida del control del intestino o de la vejiga    Entumecimiento en la ladan de las ingles    Dificultad para caminar    Fiebre de 100.4 F (38 C) o más bee, o sana le haya indicado bailey proveedor de atención médica  Date Last Reviewed: 11/12/2013 2000-2017 [employer]. [address] [place] [address], [place] [address]. Todos los derechos reservados. Esta información no pretende sustituir la atención médica profesional. Sólo bailey médico puede diagnosticar y tratar un problema de radha.          Accidente De Automóvil:Sin Lesiones Serias [Motor Vehicle Accident: No Serious Injury]  Los resultados de bailey  examen de hoy no muestran ninguna señal de lesiones serias a consecuencia de bailey accidente de automóvil. Un accidente de automóvil puede producir fuerzas muy potentes. Por lo tanto, es importante observar si aparecen nuevos síntomas que puedan señalar la existencia de adam lesión oculta. Es normal que sienta los músculos adoloridos y tensos al día siguiente. Guy si siente dolor más mesha, debe informar de ello.     Incluso en ausencia de lesiones físicas, un accidente de automóvil puede ser muy estresante y puede causar síntomas emocionales o mentales. Por ejemplo:    Adam sensación general de ansiedad y miedo    Pensamientos recurrentes o pesadillas sobre el accidente    Dificultad para dormir o cambios en el apetito    Sentimiento de depresión, tristeza o falta de energía    Irritabilidad, se enoja fácilmente    Siente la necesidad de evitar actividades, lugares o personas que le recuerdan el accidente.  En la mayoría de los casos, estas son reacciones normales y no son lo suficientemente graves para interferir con jes actividades habituales. Deberían desaparecer a los pocos días o semanas.  Cuidados En [place]:  1) Puede usar acetaminofén (Tylenol) o ibuprofeno (Motrin o Advil) para controlar el dolor, a menos que le hayan recetado otro medicamento. [ NOTA : Si tiene adam enfermedad hepática o renal crónica, o ha tenido alguna vez adam úlcera estomacal o sangrado gastrointestinal, consulte con bailey médico antes de carolina estos medicamentos.]  Programe adam VISITA DE CONTROL con bailey médico o con amparo centro si no se siente eldon al cabo de 48 horas. Si los síntomas emocionales o mentales arndt más de 3 semanas, vaya a geraldine a bailey médico. Es posible que tenga adam reacción traumática más seria por estrés. Hay ciertos tratamientos que pueden ser útiles.  [NOTA: Si le hicieron radiografías, estas serán examinadas por un radiólogo y le informarán de los nuevos hallazgos que puedan afectar la atención médica que necesita.]  Busque  Prontamente Atención Médica  si algo de lo siguiente ocurre:  -- Se presenta dolor de rachel o problemas visuales, o empeoran, en daniela de que ya los tuviera.  -- Dolor nuevo o que va empeorando en el arsh, la espalda, el abdomen, el brazo o la pierna.  -- Falta de aire o mayor dolor en el pecho.  -- Vómito persistente, mareo o desmayo.  -- Somnolencia excesiva, o no se lo puede despertar de la manera habitual.  -- Confusión o cambios en la conducta o en la manera de hablar, pérdida de la memoria, visión borrosa.  -- Hinchazón, enrojecimiento o pus que supura de cualquiera de las heridas.  Date Last Reviewed: 11/5/2015 2000-2017 [employer]. [address] [place] [address]. Todos los derechos reservados. Esta información no pretende sustituir la atención médica profesional. Sólo bailey médico puede diagnosticar y tratar un problema de radha.

## 2018-10-08 ENCOUNTER — TRANSFERRED RECORDS (OUTPATIENT)
Dept: HEALTH INFORMATION MANAGEMENT | Facility: CLINIC | Age: 53
End: 2018-10-08

## 2018-10-30 ENCOUNTER — TRANSFERRED RECORDS (OUTPATIENT)
Dept: HEALTH INFORMATION MANAGEMENT | Facility: CLINIC | Age: 53
End: 2018-10-30

## 2018-11-06 DIAGNOSIS — I48.91 ATRIAL FIBRILLATION WITH RAPID VENTRICULAR RESPONSE (H): Primary | ICD-10-CM

## 2018-11-07 ENCOUNTER — OFFICE VISIT (OUTPATIENT)
Dept: CARDIOLOGY | Facility: CLINIC | Age: 53
End: 2018-11-07

## 2018-11-07 VITALS
SYSTOLIC BLOOD PRESSURE: 126 MMHG | HEART RATE: 56 BPM | DIASTOLIC BLOOD PRESSURE: 66 MMHG | HEIGHT: 68 IN | WEIGHT: 206 LBS | BODY MASS INDEX: 31.22 KG/M2

## 2018-11-07 DIAGNOSIS — I48.91 ATRIAL FIBRILLATION WITH RAPID VENTRICULAR RESPONSE (H): ICD-10-CM

## 2018-11-07 PROCEDURE — 93000 ELECTROCARDIOGRAM COMPLETE: CPT | Performed by: INTERNAL MEDICINE

## 2018-11-07 PROCEDURE — 99213 OFFICE O/P EST LOW 20 MIN: CPT | Performed by: INTERNAL MEDICINE

## 2018-11-07 RX ORDER — CHOLECALCIFEROL (VITAMIN D3) 50 MCG
1 TABLET ORAL DAILY
COMMUNITY

## 2018-11-07 RX ORDER — LISINOPRIL 10 MG/1
10 TABLET ORAL DAILY
COMMUNITY
End: 2022-12-11

## 2018-11-07 NOTE — LETTER
2018      [facility]  [address] Attn: Him Dept  [facility] [address]      RE: Jordan Warner       Dear Colleague,    I had the pleasure of seeing Jordan Warner in the [facility].    Service Date: 2018      REASON FOR VISIT:  Evaluation of paroxysmal AFib.      HISTORY OF PRESENT ILLNESS:  Mr. Warner is a delightful 53-year-old gentleman with a history of prediabetes and hypertension who is here for evaluation of paroxysmal AFib.      The patient initially presented with episode of palpitation and was evaluated in the ED, and he was found to have atrial fibrillation.  He underwent a successful cardioversion (2017).  He had a recurrence of atrial arrhythmia on the  and was found to be in atrial flutter.  The atrial flutter terminated with diltiazem drip.      I evaluated him on 2017.  At that time he underwent a stress test that was negative for ischemia and was started on a combination of metoprolol and flecainide.      At the moment, he is doing well.  He informs that on this combination, he has not had any recurrence of atrial arrhythmias.  He denies any symptoms of chest pain, shortness of breath, lightheadedness, near-syncope or syncopal episode.      Of note, he was recently diagnosed with hypertension and was started on flecainide***.      ASSESSMENT AND PLAN:   1.  Paroxysmal AFib.  Responded well to combination of flecainide and metoprolol.  Continue current medical therapy.   2.  Embolic prevention.  CHADS-VASc score of 1.  Continue baby aspirin.   3.  Hypertension.  Blood pressure is well controlled.  Continue metoprolol and lisinopril.   4.  Followup care.  Follow up in clinic in a year or earlier as needed.         GRADY RAMIREZ MD             D: 2018   T: 2018   MT: SACHI      Name:     JORDAN WARNER   MRN:      [id number]        Account:      [id number]   :      1965           Service Date: 2018       Document: [id number]         Outpatient Encounter Prescriptions as of 11/7/2018   Medication Sig Dispense Refill     aspirin 81 MG tablet Take 81 mg by mouth daily       ATORVASTATIN CALCIUM PO Take 10 mg by mouth daily        Cholecalciferol (VITAMIN D) 2000 units tablet Take 1 tablet by mouth daily       flecainide (TAMBOCOR) 50 MG tablet Take 1 tablet (50 mg) by mouth 2 times daily 180 tablet 3     lisinopril (PRINIVIL/ZESTRIL) 10 MG tablet Take 10 mg by mouth daily       metoprolol (TOPROL-XL) 25 MG 24 hr tablet Take 0.5 tablets (12.5 mg) by mouth daily 45 tablet 3     methocarbamol (ROBAXIN) 500 MG tablet Take 2 tablets (1,000 mg) by mouth 3 times daily as needed for muscle spasms (Patient not taking: Reported on 11/7/2018) 30 tablet 1     No facility-administered encounter medications on file as of 11/7/2018.        Again, thank you for allowing me to participate in the care of your patient.      Sincerely,    Greg Stout MD     [facility]

## 2018-11-07 NOTE — LETTER
2018      [facility]  [address] [facility] Attn: Him Dept  [facility] [address]      RE: Jordan Warner       Dear Colleague,    I had the pleasure of seeing Jordan Warner in the [facility].    Service Date: 2018      REASON FOR VISIT:  Evaluation of paroxysmal AFib.      HISTORY OF PRESENT ILLNESS:  Mr. Warner is a delightful 53-year-old gentleman with a history of prediabetes and hypertension who is here for evaluation of paroxysmal AFib.      The patient initially presented with episode of palpitation and was evaluated in the ED, and he was found to have atrial fibrillation.  He underwent a successful cardioversion (2017).  He had a recurrence of atrial arrhythmia on the  and was found to be in atrial flutter.  The atrial flutter terminated with diltiazem drip.      I evaluated him on 2017.  At that time he underwent a stress test that was negative for ischemia and was started on a combination of metoprolol and flecainide.      At the moment, he is doing well.  He informs that on this combination, he has not had any recurrence of atrial arrhythmias.  He denies any symptoms of chest pain, shortness of breath, lightheadedness, near-syncope or syncopal episode.      Of note, he was recently diagnosed with hypertension and was started on flecainide***.      ASSESSMENT AND PLAN:   1.  Paroxysmal AFib.  Responded well to combination of flecainide and metoprolol.  Continue current medical therapy.   2.  Embolic prevention.  CHADS-VASc score of 1.  Continue baby aspirin.   3.  Hypertension.  Blood pressure is well controlled.  Continue metoprolol and lisinopril.   4.  Followup care.  Follow up in clinic in a year or earlier as needed.         GRADY RAMIREZ MD             D: 2018   T: 2018   MT: SACHI      Name:     JORDAN WARNER   MRN:      [id number]        Account:      [id number]   :      1965           Service Date: 2018       Document: [id number]         Outpatient Encounter Prescriptions as of 11/7/2018   Medication Sig Dispense Refill     aspirin 81 MG tablet Take 81 mg by mouth daily       ATORVASTATIN CALCIUM PO Take 10 mg by mouth daily        Cholecalciferol (VITAMIN D) 2000 units tablet Take 1 tablet by mouth daily       flecainide (TAMBOCOR) 50 MG tablet Take 1 tablet (50 mg) by mouth 2 times daily 180 tablet 3     lisinopril (PRINIVIL/ZESTRIL) 10 MG tablet Take 10 mg by mouth daily       metoprolol (TOPROL-XL) 25 MG 24 hr tablet Take 0.5 tablets (12.5 mg) by mouth daily 45 tablet 3     methocarbamol (ROBAXIN) 500 MG tablet Take 2 tablets (1,000 mg) by mouth 3 times daily as needed for muscle spasms (Patient not taking: Reported on 11/7/2018) 30 tablet 1     No facility-administered encounter medications on file as of 11/7/2018.        Again, thank you for allowing me to participate in the care of your patient.      Sincerely,    Greg Stout MD     [facility]

## 2018-11-07 NOTE — MR AVS SNAPSHOT
"              After Visit Summary   11/7/2018    Jordan Warner    MRN: [id number]           Patient Information     Date Of Birth          1965        Visit Information        Provider Department      11/7/2018 4:15 PM Greg Stout MD; [employer] [facility]        Today's Diagnoses     Atrial fibrillation with rapid ventricular response (H)           Follow-ups after your visit        Who to contact     If you have questions or need follow up information about today's clinic visit or your schedule please contact [facility] directly at [phone].  Normal or non-critical lab and imaging results will be communicated to you by MyChart, letter or phone within 4 business days after the clinic has received the results. If you do not hear from us within 7 days, please contact the clinic through MyChart or phone. If you have a critical or abnormal lab result, we will notify you by phone as soon as possible.  Submit refill requests through [facility] or call your pharmacy and they will forward the refill request to us. Please allow 3 business days for your refill to be completed.          Additional Information About Your Visit        Care EveryWhere ID     This is your Care EveryWhere ID. This could be used by other organizations to access your [place] medical records  [id number]        Your Vitals Were     Pulse Height BMI (Body Mass Index)             56 1.727 m (5' 8\") 31.32 kg/m2          Blood Pressure from Last 3 Encounters:   11/07/18 126/66   08/20/18 142/76   10/03/17 132/72    Weight from Last 3 Encounters:   11/07/18 93.4 kg (206 lb)   08/19/18 93 kg (205 lb)   10/03/17 96.2 kg (212 lb)              We Performed the Following     EKG 12-lead complete w/read - Clinics (future- to be scheduled)     Follow-Up with Electrophysiologist        Primary Care Provider Fax #    Patria Duran [phone] "       [address] [facility] Attn: HIM Dept  [facility] [address]        Equal Access to Services     MAMADOUMONICA MAYA : Hadii aad ku hadpollyo Sojasenali, waaxda luqadaha, qaybta kaalmada aderenanyada, janie lugo nettemahamed quesadabryceharsha bey. So [facility] [phone].    ATENCIÓN: Si habla español, tiene a bailey disposición servicios gratuitos de asistencia lingüística. Alpa al [phone].    We comply with applicable federal civil rights laws and [place] laws. We do not discriminate on the basis of race, color, national origin, age, disability, sex, sexual orientation, or gender identity.            Thank you!     Thank you for choosing [facility]  for your care. Our goal is always to provide you with excellent care. Hearing back from our patients is one way we can continue to improve our services. Please take a few minutes to complete the written survey that you may receive in the mail after your visit with us. Thank you!             Your Updated Medication List - Protect others around you: Learn how to safely use, store and throw away your medicines at www.disposemymeds.org.          This list is accurate as of 11/7/18  4:49 PM.  Always use your most recent med list.                   Brand Name Dispense Instructions for use Diagnosis    aspirin 81 MG tablet      Take 81 mg by mouth daily        ATORVASTATIN CALCIUM PO      Take 10 mg by mouth daily        flecainide 50 MG tablet    TAMBOCOR    180 tablet    Take 1 tablet (50 mg) by mouth 2 times daily    Atrial fibrillation with rapid ventricular response (H)       lisinopril 10 MG tablet    PRINIVIL/ZESTRIL     Take 10 mg by mouth daily        methocarbamol 500 MG tablet    ROBAXIN    30 tablet    Take 2 tablets (1,000 mg) by mouth 3 times daily as needed for muscle spasms        metoprolol succinate 25 MG 24 hr tablet    TOPROL-XL    45 tablet    Take 0.5 tablets (12.5 mg) by mouth daily    Atrial fibrillation with rapid  ventricular response (H)       vitamin D 2000 units tablet      Take 1 tablet by mouth daily

## 2018-11-07 NOTE — LETTER
2018      [facility]  [address] [facility] Attn: Him Dept  [facility] [address]      RE: Jordan Warner       Dear Colleague,    I had the pleasure of seeing Jordan Warner in the [facility].    Service Date: 2018      REASON FOR VISIT:  Evaluation of paroxysmal AFib.      HISTORY OF PRESENT ILLNESS:  Mr. Warner is a delightful 53-year-old gentleman with a history of prediabetes and hypertension who is here for evaluation of paroxysmal AFib.      The patient initially presented with palpitation and was evaluated in the ED.  He was found to have atrial fibrillation and underwent a successful cardioversion (2017).  He had a recurrence of atrial arrhythmia/ flutter on the .  The atrial flutter terminated with diltiazem drip.      I evaluated him on 2017.  At that time he underwent a stress test that was negative for ischemia and was started on a combination of metoprolol and flecainide.      At the moment, he is doing well.  He informs that on this combination, he has not had any recurrence of atrial arrhythmias.  He denies any symptoms of chest pain, shortness of breath, lightheadedness, near-syncope or syncopal episode.       ASSESSMENT AND PLAN:   1.  Paroxysmal AFib.  Responded well to combination of flecainide and metoprolol.  Continue current medical therapy.   2.  Embolic prevention.  CHADS-VASc score of 1.  Continue baby aspirin.   3.  Hypertension.  Blood pressure is well controlled.  Continue metoprolol and lisinopril.   4.  Followup care.  Follow up in clinic in a year or earlier as needed.         GRADY RAMIREZ MD             D: 2018   T: 2018   MT: SACHI      Name:     JORDAN WARNER   MRN:      [id number]        Account:      [id number]   :      1965           Service Date: 2018      Document: [id number]           Outpatient Encounter Prescriptions as of 2018   Medication Sig Dispense Refill     aspirin 81 MG  tablet Take 81 mg by mouth daily       ATORVASTATIN CALCIUM PO Take 10 mg by mouth daily        Cholecalciferol (VITAMIN D) 2000 units tablet Take 1 tablet by mouth daily       lisinopril (PRINIVIL/ZESTRIL) 10 MG tablet Take 10 mg by mouth daily       [DISCONTINUED] flecainide (TAMBOCOR) 50 MG tablet Take 1 tablet (50 mg) by mouth 2 times daily 180 tablet 3     [DISCONTINUED] metoprolol (TOPROL-XL) 25 MG 24 hr tablet Take 0.5 tablets (12.5 mg) by mouth daily 45 tablet 3     methocarbamol (ROBAXIN) 500 MG tablet Take 2 tablets (1,000 mg) by mouth 3 times daily as needed for muscle spasms (Patient not taking: Reported on 11/7/2018) 30 tablet 1     No facility-administered encounter medications on file as of 11/7/2018.        Again, thank you for allowing me to participate in the care of your patient.      Sincerely,    Greg Stout MD     [facility]

## 2018-11-07 NOTE — LETTER
2018      [facility]  [address] [facility] [facility] Attn: Him Dept  [facility] [address]      RE: Jordan Warner       Dear Colleague,    I had the pleasure of seeing Jordan Warner in the [facility].    Service Date: 2018      REASON FOR VISIT:  Evaluation of paroxysmal AFib.      HISTORY OF PRESENT ILLNESS:  Mr. Warner is a delightful 53-year-old gentleman with a history of prediabetes and hypertension who is here for evaluation of paroxysmal AFib.      The patient initially presented with episode of palpitation and was evaluated in the ED, and he was found to have atrial fibrillation.  He underwent a successful cardioversion (2017).  He had a recurrence of atrial arrhythmia on the  and was found to be in atrial flutter.  The atrial flutter terminated with diltiazem drip.      I evaluated him on 2017.  At that time he underwent a stress test that was negative for ischemia and was started on a combination of metoprolol and flecainide.      At the moment, he is doing well.  He informs that on this combination, he has not had any recurrence of atrial arrhythmias.  He denies any symptoms of chest pain, shortness of breath, lightheadedness, near-syncope or syncopal episode.      Of note, he was recently diagnosed with hypertension and was started on flecainide***.      ASSESSMENT AND PLAN:   1.  Paroxysmal AFib.  Responded well to combination of flecainide and metoprolol.  Continue current medical therapy.   2.  Embolic prevention.  CHADS-VASc score of 1.  Continue baby aspirin.   3.  Hypertension.  Blood pressure is well controlled.  Continue metoprolol and lisinopril.   4.  Followup care.  Follow up in clinic in a year or earlier as needed.         GRADY RAMIREZ MD             D: 2018   T: 2018   MT: SACHI      Name:     JORDAN WARNER   MRN:      [id number]        Account:      [id number]   :      1965           Service Date: 2018       Document: [id number]         Outpatient Encounter Prescriptions as of 11/7/2018   Medication Sig Dispense Refill     aspirin 81 MG tablet Take 81 mg by mouth daily       ATORVASTATIN CALCIUM PO Take 10 mg by mouth daily        Cholecalciferol (VITAMIN D) 2000 units tablet Take 1 tablet by mouth daily       lisinopril (PRINIVIL/ZESTRIL) 10 MG tablet Take 10 mg by mouth daily       [DISCONTINUED] flecainide (TAMBOCOR) 50 MG tablet Take 1 tablet (50 mg) by mouth 2 times daily 180 tablet 3     [DISCONTINUED] metoprolol (TOPROL-XL) 25 MG 24 hr tablet Take 0.5 tablets (12.5 mg) by mouth daily 45 tablet 3     methocarbamol (ROBAXIN) 500 MG tablet Take 2 tablets (1,000 mg) by mouth 3 times daily as needed for muscle spasms (Patient not taking: Reported on 11/7/2018) 30 tablet 1     No facility-administered encounter medications on file as of 11/7/2018.        Again, thank you for allowing me to participate in the care of your patient.      Sincerely,    Greg Stout MD     [facility]

## 2018-11-07 NOTE — LETTER
"11/7/2018    [facility]  [address] [facility] Attn: Him Dept  [facility] [address]    RE: Jordan Warner       Dear Colleague,    I had the pleasure of seeing Jordan Warner in the [facility].    [id number]    Electrophysiology/ Clinic Note         H&P and Plan:         Greg Stout MD    Physical Exam:  Vitals: /66 (BP Location: Right arm, Patient Position: Sitting, Cuff Size: Adult Large)  Pulse 56  Ht 1.727 m (5' 8\")  Wt 93.4 kg (206 lb)  BMI 31.32 kg/m2    Constitutional:  AAO x3.  Pt is in NAD.  HEAD: normocephalic.  SKIN: Skin normal color, texture and turgor with no lesions or eruptions.  Eyes: PERRL, EOMI.  ENT:  Supple, normal JVP. No lymphadenopathy or thyroid enlargement.  Chest:  CTAB.  Cardiac:   RRR, normal  S1 and S2.  No murmurs rubs or gallop.    Abdomen:  Normal BS.  Soft, non-tender and non-distended.  No rebound or guarding.    Extremities:  Pedious pulses palpable B/L.  No LE edema noticed.   Neurological: Strength and sensation grossly symmetric and intact throughout.       CURRENT MEDICATIONS:  Current Outpatient Prescriptions   Medication Sig Dispense Refill     aspirin 81 MG tablet Take 81 mg by mouth daily       ATORVASTATIN CALCIUM PO Take 10 mg by mouth daily        Cholecalciferol (VITAMIN D) 2000 units tablet Take 1 tablet by mouth daily       flecainide (TAMBOCOR) 50 MG tablet Take 1 tablet (50 mg) by mouth 2 times daily 180 tablet 3     lisinopril (PRINIVIL/ZESTRIL) 10 MG tablet Take 10 mg by mouth daily       metoprolol (TOPROL-XL) 25 MG 24 hr tablet Take 0.5 tablets (12.5 mg) by mouth daily 45 tablet 3     methocarbamol (ROBAXIN) 500 MG tablet Take 2 tablets (1,000 mg) by mouth 3 times daily as needed for muscle spasms (Patient not taking: Reported on 11/7/2018) 30 tablet 1       ALLERGIES   No Known Allergies    PAST MEDICAL HISTORY:  Past Medical History:   Diagnosis Date     Atrial fibrillation (H) 02/05/2017/returned to ED On 2/13 back in AF "    Cardioverted on second attempt in ED     Prediabetes        PAST SURGICAL HISTORY:  History reviewed. No pertinent surgical history.    FAMILY HISTORY:  Family History   Problem Relation Age of Onset     Unknown/Adopted Father      Appendicitis       SOCIAL HISTORY:  Social History     Social History     Marital status:      Spouse name: N/A     Number of children: N/A     Years of education: N/A     Social History Main Topics     Smoking status: Never Smoker     Smokeless tobacco: Never Used     Alcohol use Yes      Comment: Socially     Drug use: None     Sexual activity: Not Asked     Other Topics Concern     Parent/Sibling W/ Cabg, Mi Or Angioplasty Before 65f 55m? No     Social History Narrative       Review of Systems:  Skin:        Eyes:       ENT:       Respiratory:  Negative    Cardiovascular:  Negative    Gastroenterology:      Genitourinary:       Musculoskeletal:       Neurologic:       Psychiatric:       Heme/Lymph/Imm:       Endocrine:           Recent Lab Results:  LIPID RESULTS:  Lab Results   Component Value Date    CHOL 159 02/14/2017    HDL 59 02/14/2017    LDL 75 02/14/2017    TRIG 124 02/14/2017       LIVER ENZYME RESULTS:  Lab Results   Component Value Date    AST 25 02/13/2017    ALT 43 02/13/2017       CBC RESULTS:  Lab Results   Component Value Date    WBC 8.4 02/13/2017    RBC 5.45 02/13/2017    HGB 16.0 02/13/2017    HCT 48.1 02/13/2017    MCV 88 02/13/2017    MCH 29.4 02/13/2017    MCHC 33.3 02/13/2017    RDW 12.3 02/13/2017     02/13/2017       BMP RESULTS:  Lab Results   Component Value Date     02/13/2017    POTASSIUM 3.9 02/13/2017    CHLORIDE 107 02/13/2017    CO2 24 02/13/2017    ANIONGAP 9 02/13/2017     (H) 02/13/2017    BUN 13 02/13/2017    CR 0.85 02/13/2017    GFRESTIMATED >90  Non  GFR Calc   02/13/2017    GFRESTBLACK >90   GFR Calc   02/13/2017    HARSHA 8.7 02/13/2017        A1C RESULTS:  Lab Results   Component  Value Date    A1C 6.6 (H) 02/14/2017       INR RESULTS:  No results found for: INR      ECHOCARDIOGRAM  No results found for this or any previous visit (from the past 8760 hour(s)).      No orders of the defined types were placed in this encounter.    Orders Placed This Encounter   Medications     Cholecalciferol (VITAMIN D) 2000 units tablet     Sig: Take 1 tablet by mouth daily     lisinopril (PRINIVIL/ZESTRIL) 10 MG tablet     Sig: Take 10 mg by mouth daily     There are no discontinued medications.      Encounter Diagnosis   Name Primary?     Atrial fibrillation with rapid ventricular response (H)          CC  Osiris Batista PA-C  [address]  [place] [address]                Thank you for allowing me to participate in the care of your patient.      Sincerely,     Greg Stout MD     [facility]    cc:   Osiris Batista PA-C  [address]  [place] [address]

## 2018-11-07 NOTE — PROGRESS NOTES
"[id number]    Electrophysiology/ Clinic Note         H&P and Plan:         Greg Stout MD    Physical Exam:  Vitals: /66 (BP Location: Right arm, Patient Position: Sitting, Cuff Size: Adult Large)  Pulse 56  Ht 1.727 m (5' 8\")  Wt 93.4 kg (206 lb)  BMI 31.32 kg/m2    Constitutional:  AAO x3.  Pt is in NAD.  HEAD: normocephalic.  SKIN: Skin normal color, texture and turgor with no lesions or eruptions.  Eyes: PERRL, EOMI.  ENT:  Supple, normal JVP. No lymphadenopathy or thyroid enlargement.  Chest:  CTAB.  Cardiac:   RRR, normal  S1 and S2.  No murmurs rubs or gallop.    Abdomen:  Normal BS.  Soft, non-tender and non-distended.  No rebound or guarding.    Extremities:  Pedious pulses palpable B/L.  No LE edema noticed.   Neurological: Strength and sensation grossly symmetric and intact throughout.       CURRENT MEDICATIONS:  Current Outpatient Prescriptions   Medication Sig Dispense Refill     aspirin 81 MG tablet Take 81 mg by mouth daily       ATORVASTATIN CALCIUM PO Take 10 mg by mouth daily        Cholecalciferol (VITAMIN D) 2000 units tablet Take 1 tablet by mouth daily       flecainide (TAMBOCOR) 50 MG tablet Take 1 tablet (50 mg) by mouth 2 times daily 180 tablet 3     lisinopril (PRINIVIL/ZESTRIL) 10 MG tablet Take 10 mg by mouth daily       metoprolol (TOPROL-XL) 25 MG 24 hr tablet Take 0.5 tablets (12.5 mg) by mouth daily 45 tablet 3     methocarbamol (ROBAXIN) 500 MG tablet Take 2 tablets (1,000 mg) by mouth 3 times daily as needed for muscle spasms (Patient not taking: Reported on 11/7/2018) 30 tablet 1       ALLERGIES   No Known Allergies    PAST MEDICAL HISTORY:  Past Medical History:   Diagnosis Date     Atrial fibrillation (H) 02/05/2017/returned to ED On 2/13 back in AF    Cardioverted on second attempt in ED     Prediabetes        PAST SURGICAL HISTORY:  History reviewed. No pertinent surgical history.    FAMILY HISTORY:  Family History   Problem Relation Age of Onset     Unknown/Adopted " Father      Appendicitis       SOCIAL HISTORY:  Social History     Social History     Marital status:      Spouse name: N/A     Number of children: N/A     Years of education: N/A     Social History Main Topics     Smoking status: Never Smoker     Smokeless tobacco: Never Used     Alcohol use Yes      Comment: Socially     Drug use: None     Sexual activity: Not Asked     Other Topics Concern     Parent/Sibling W/ Cabg, Mi Or Angioplasty Before 65f 55m? No     Social History Narrative       Review of Systems:  Skin:        Eyes:       ENT:       Respiratory:  Negative    Cardiovascular:  Negative    Gastroenterology:      Genitourinary:       Musculoskeletal:       Neurologic:       Psychiatric:       Heme/Lymph/Imm:       Endocrine:           Recent Lab Results:  LIPID RESULTS:  Lab Results   Component Value Date    CHOL 159 02/14/2017    HDL 59 02/14/2017    LDL 75 02/14/2017    TRIG 124 02/14/2017       LIVER ENZYME RESULTS:  Lab Results   Component Value Date    AST 25 02/13/2017    ALT 43 02/13/2017       CBC RESULTS:  Lab Results   Component Value Date    WBC 8.4 02/13/2017    RBC 5.45 02/13/2017    HGB 16.0 02/13/2017    HCT 48.1 02/13/2017    MCV 88 02/13/2017    MCH 29.4 02/13/2017    MCHC 33.3 02/13/2017    RDW 12.3 02/13/2017     02/13/2017       BMP RESULTS:  Lab Results   Component Value Date     02/13/2017    POTASSIUM 3.9 02/13/2017    CHLORIDE 107 02/13/2017    CO2 24 02/13/2017    ANIONGAP 9 02/13/2017     (H) 02/13/2017    BUN 13 02/13/2017    CR 0.85 02/13/2017    GFRESTIMATED >90  Non  GFR Calc   02/13/2017    GFRESTBLACK >90   GFR Calc   02/13/2017    HARSHA 8.7 02/13/2017        A1C RESULTS:  Lab Results   Component Value Date    A1C 6.6 (H) 02/14/2017       INR RESULTS:  No results found for: INR      ECHOCARDIOGRAM  No results found for this or any previous visit (from the past 8760 hour(s)).      No orders of the defined types were  placed in this encounter.    Orders Placed This Encounter   Medications     Cholecalciferol (VITAMIN D) 2000 units tablet     Sig: Take 1 tablet by mouth daily     lisinopril (PRINIVIL/ZESTRIL) 10 MG tablet     Sig: Take 10 mg by mouth daily     There are no discontinued medications.      Encounter Diagnosis   Name Primary?     Atrial fibrillation with rapid ventricular response (H)          CC  Osiris Batista, PASotoC  [address]  [place] [id number]

## 2018-11-08 NOTE — PROGRESS NOTES
Service Date: 2018      REASON FOR VISIT:  Evaluation of paroxysmal AFib.      HISTORY OF PRESENT ILLNESS:  Mr. Warner is a delightful 53-year-old gentleman with a history of prediabetes and hypertension who is here for evaluation of paroxysmal AFib.      The patient initially presented with palpitation and was evaluated in the ED.  He was found to have atrial fibrillation and underwent a successful cardioversion (2017).  He had a recurrence of atrial arrhythmia/ flutter on the .  The atrial flutter terminated with diltiazem drip.      I evaluated him on 2017.  At that time he underwent a stress test that was negative for ischemia and was started on a combination of metoprolol and flecainide.      At the moment, he is doing well.  He informs that on this combination, he has not had any recurrence of atrial arrhythmias.  He denies any symptoms of chest pain, shortness of breath, lightheadedness, near-syncope or syncopal episode.       ASSESSMENT AND PLAN:   1.  Paroxysmal AFib.  Responded well to combination of flecainide and metoprolol.  Continue current medical therapy.   2.  Embolic prevention.  CHADS-VASc score of 1.  Continue baby aspirin.   3.  Hypertension.  Blood pressure is well controlled.  Continue metoprolol and lisinopril.   4.  Followup care.  Follow up in clinic in a year or earlier as needed.         GRADY RAMIREZ MD             D: 2018   T: 2018   MT: SACHI      Name:     AN WARNER   MRN:      [id number]        Account:      [id number]   :      1965           Service Date: 2018      Document: [id number]

## 2018-11-16 DIAGNOSIS — I48.91 ATRIAL FIBRILLATION WITH RAPID VENTRICULAR RESPONSE (H): ICD-10-CM

## 2018-11-16 RX ORDER — METOPROLOL SUCCINATE 25 MG/1
12.5 TABLET, EXTENDED RELEASE ORAL DAILY
Qty: 45 TABLET | Refills: 3 | Status: SHIPPED | OUTPATIENT
Start: 2018-11-16

## 2018-11-16 RX ORDER — FLECAINIDE ACETATE 50 MG/1
50 TABLET ORAL 2 TIMES DAILY
Qty: 180 TABLET | Refills: 3 | Status: SHIPPED | OUTPATIENT
Start: 2018-11-16

## 2018-11-16 NOTE — TELEPHONE ENCOUNTER
Wife stating that they had forgot to have the Flecainide and Metoprolol refilled when they saw Dr Stout on 11/7. Escript sent. Carl

## 2022-12-11 ENCOUNTER — APPOINTMENT (OUTPATIENT)
Dept: MRI IMAGING | Facility: CLINIC | Age: 57
End: 2022-12-11
Attending: EMERGENCY MEDICINE

## 2022-12-11 ENCOUNTER — HOSPITAL ENCOUNTER (EMERGENCY)
Facility: CLINIC | Age: 57
Discharge: HOME | End: 2022-12-11
Attending: EMERGENCY MEDICINE | Admitting: EMERGENCY MEDICINE

## 2022-12-11 ENCOUNTER — APPOINTMENT (OUTPATIENT)
Dept: CT IMAGING | Facility: CLINIC | Age: 57
End: 2022-12-11
Attending: INTERNAL MEDICINE

## 2022-12-11 VITALS
HEART RATE: 51 BPM | SYSTOLIC BLOOD PRESSURE: 131 MMHG | OXYGEN SATURATION: 99 % | TEMPERATURE: 97.6 F | RESPIRATION RATE: 17 BRPM | DIASTOLIC BLOOD PRESSURE: 74 MMHG

## 2022-12-11 DIAGNOSIS — R42 DIZZINESS: ICD-10-CM

## 2022-12-11 DIAGNOSIS — R20.2 PARESTHESIAS: ICD-10-CM

## 2022-12-11 LAB
ANION GAP SERPL CALCULATED.3IONS-SCNC: 9 MMOL/L (ref 7–15)
APTT PPP: 26 SECONDS (ref 22–38)
BASOPHILS # BLD AUTO: 0 10E3/UL (ref 0–0.2)
BASOPHILS NFR BLD AUTO: 0 %
BUN SERPL-MCNC: 20.9 MG/DL (ref 6–20)
CALCIUM SERPL-MCNC: 9.1 MG/DL (ref 8.6–10)
CHLORIDE SERPL-SCNC: 103 MMOL/L (ref 98–107)
CREAT SERPL-MCNC: 0.9 MG/DL (ref 0.67–1.17)
DEPRECATED HCO3 PLAS-SCNC: 26 MMOL/L (ref 22–29)
EOSINOPHIL # BLD AUTO: 0.2 10E3/UL (ref 0–0.7)
EOSINOPHIL NFR BLD AUTO: 3 %
ERYTHROCYTE [DISTWIDTH] IN BLOOD BY AUTOMATED COUNT: 12.1 % (ref 10–15)
GFR SERPL CREATININE-BSD FRML MDRD: >90 ML/MIN/1.73M2
GLUCOSE SERPL-MCNC: 108 MG/DL (ref 70–99)
HCT VFR BLD AUTO: 45.8 % (ref 40–53)
HGB BLD-MCNC: 14.9 G/DL (ref 13.3–17.7)
IMM GRANULOCYTES # BLD: 0 10E3/UL
IMM GRANULOCYTES NFR BLD: 0 %
INR PPP: 0.99 (ref 0.85–1.15)
LYMPHOCYTES # BLD AUTO: 1.8 10E3/UL (ref 0.8–5.3)
LYMPHOCYTES NFR BLD AUTO: 26 %
MCH RBC QN AUTO: 30.5 PG (ref 26.5–33)
MCHC RBC AUTO-ENTMCNC: 32.5 G/DL (ref 31.5–36.5)
MCV RBC AUTO: 94 FL (ref 78–100)
MONOCYTES # BLD AUTO: 0.7 10E3/UL (ref 0–1.3)
MONOCYTES NFR BLD AUTO: 10 %
NEUTROPHILS # BLD AUTO: 4 10E3/UL (ref 1.6–8.3)
NEUTROPHILS NFR BLD AUTO: 61 %
NRBC # BLD AUTO: 0 10E3/UL
NRBC BLD AUTO-RTO: 0 /100
PLATELET # BLD AUTO: 196 10E3/UL (ref 150–450)
POTASSIUM SERPL-SCNC: 4.2 MMOL/L (ref 3.4–5.3)
RBC # BLD AUTO: 4.88 10E6/UL (ref 4.4–5.9)
SODIUM SERPL-SCNC: 138 MMOL/L (ref 136–145)
TROPONIN T SERPL HS-MCNC: 9 NG/L
WBC # BLD AUTO: 6.7 10E3/UL (ref 4–11)

## 2022-12-11 PROCEDURE — 84484 ASSAY OF TROPONIN QUANT: CPT | Performed by: EMERGENCY MEDICINE

## 2022-12-11 PROCEDURE — 70450 CT HEAD/BRAIN W/O DYE: CPT

## 2022-12-11 PROCEDURE — 85730 THROMBOPLASTIN TIME PARTIAL: CPT | Performed by: EMERGENCY MEDICINE

## 2022-12-11 PROCEDURE — 255N000002 HC RX 255 OP 636: Performed by: EMERGENCY MEDICINE

## 2022-12-11 PROCEDURE — 36415 COLL VENOUS BLD VENIPUNCTURE: CPT | Performed by: EMERGENCY MEDICINE

## 2022-12-11 PROCEDURE — 85610 PROTHROMBIN TIME: CPT | Performed by: EMERGENCY MEDICINE

## 2022-12-11 PROCEDURE — 70549 MR ANGIOGRAPH NECK W/O&W/DYE: CPT

## 2022-12-11 PROCEDURE — 99285 EMERGENCY DEPT VISIT HI MDM: CPT | Mod: 25

## 2022-12-11 PROCEDURE — 36415 COLL VENOUS BLD VENIPUNCTURE: CPT | Performed by: INTERNAL MEDICINE

## 2022-12-11 PROCEDURE — 70544 MR ANGIOGRAPHY HEAD W/O DYE: CPT

## 2022-12-11 PROCEDURE — 80048 BASIC METABOLIC PNL TOTAL CA: CPT | Performed by: INTERNAL MEDICINE

## 2022-12-11 PROCEDURE — 70553 MRI BRAIN STEM W/O & W/DYE: CPT

## 2022-12-11 PROCEDURE — A9585 GADOBUTROL INJECTION: HCPCS | Performed by: EMERGENCY MEDICINE

## 2022-12-11 PROCEDURE — 85004 AUTOMATED DIFF WBC COUNT: CPT | Performed by: INTERNAL MEDICINE

## 2022-12-11 PROCEDURE — 93005 ELECTROCARDIOGRAM TRACING: CPT

## 2022-12-11 RX ORDER — GADOBUTROL 604.72 MG/ML
10 INJECTION INTRAVENOUS ONCE
Status: COMPLETED | OUTPATIENT
Start: 2022-12-11 | End: 2022-12-11

## 2022-12-11 RX ORDER — SODIUM CHLORIDE 9 MG/ML
INJECTION, SOLUTION INTRAVENOUS CONTINUOUS PRN
Status: DISCONTINUED | OUTPATIENT
Start: 2022-12-11 | End: 2022-12-12 | Stop reason: HOSPADM

## 2022-12-11 RX ORDER — AMLODIPINE BESYLATE 5 MG/1
5 TABLET ORAL DAILY
Qty: 30 TABLET | Refills: 0 | Status: SHIPPED | OUTPATIENT
Start: 2022-12-11 | End: 2023-01-10

## 2022-12-11 RX ADMIN — GADOBUTROL 10 ML: 604.72 INJECTION INTRAVENOUS at 21:21

## 2022-12-11 ASSESSMENT — ENCOUNTER SYMPTOMS
NAUSEA: 1
TROUBLE SWALLOWING: 0
NUMBNESS: 1
DIZZINESS: 1

## 2022-12-11 ASSESSMENT — ACTIVITIES OF DAILY LIVING (ADL)
ADLS_ACUITY_SCORE: 35
ADLS_ACUITY_SCORE: 33
ADLS_ACUITY_SCORE: 35

## 2022-12-11 NOTE — ED TRIAGE NOTES
"Pt complains of L sided lip numbness. Pt states 5 days ago he had \"eye twitching.\" Pt has hx of heart fibrosis per family member. Pt is take a blood thinner. Pt states he has been having frequent HA - where he has to take a tylenol a day to relieve HA. Pt states he feels \"like ants in the L foot for past 2 days.\" Pt complains of dizziness.      Triage Assessment       Row Name 12/11/22 9525       Triage Assessment (Adult)    Airway WDL WDL       Respiratory WDL    Respiratory WDL WDL       Skin Circulation/Temperature WDL    Skin Circulation/Temperature WDL WDL       Cardiac WDL    Cardiac WDL WDL       Peripheral/Neurovascular WDL    Peripheral Neurovascular WDL WDL       Cognitive/Neuro/Behavioral WDL    Cognitive/Neuro/Behavioral WDL WDL                  "

## 2022-12-12 LAB
ATRIAL RATE - MUSE: 52 BPM
DIASTOLIC BLOOD PRESSURE - MUSE: NORMAL MMHG
INTERPRETATION ECG - MUSE: NORMAL
P AXIS - MUSE: 44 DEGREES
PR INTERVAL - MUSE: 144 MS
QRS DURATION - MUSE: 100 MS
QT - MUSE: 418 MS
QTC - MUSE: 388 MS
R AXIS - MUSE: 5 DEGREES
SYSTOLIC BLOOD PRESSURE - MUSE: NORMAL MMHG
T AXIS - MUSE: 36 DEGREES
VENTRICULAR RATE- MUSE: 52 BPM

## 2022-12-12 NOTE — ED NOTES
Rapid Assessment Note    History:   Jordan Warner is a 57 year old male who presents with left lower lip numbness since 3 days ago. He also has some numbness above the left eye. The onset of this was around the same time he had a frontal headache. He denies head injury. Another symptom mentioned is intermittent tingling in his left foot. During assessment, his wife notes that he has been under a lot of stress lately. He denies gait problems.       Exam:   General:  Alert, interactive  Cardiovascular:  Well perfused  Lungs:  No respiratory distress, no accessory muscle use  Neuro:  Face symmetric. EOMI. Moving all 4 extremities  Skin:  Warm, dry  Psych:  Normal affect    Plan of Care:   I evaluated the patient and developed an initial plan of care. I discussed this plan and explained that I, or one of my partners, would be returning to complete the evaluation.     Doubt stroke, but given symptoms for 3 days will obtain CT, labs.     I, Sudheerchandu Sharp, am serving as a scribe to document services personally performed by Misty Bridges MD, based on my observations and the provider's statements to me.    12/11/2022  [employer]    Portions of this medical record were completed by a scribe. UPON MY REVIEW AND AUTHENTICATION BY ELECTRONIC SIGNATURE, this confirms (a) I performed the applicable clinical services, and (b) the record is accurate.      Misty Bridges MD  12/11/22 1913

## 2022-12-12 NOTE — ED NOTES
"Agree with triage- patient states that his left lower half of lip is \"sleepy\" states he still has sensation when he touches is. Patient has eye 'twitching'- which he currently does not have. Patient states he gets headaches, no changes in those with severity or other symptoms- resolved by Tylenol.   "

## 2022-12-12 NOTE — ED PROVIDER NOTES
"  History   Chief Complaint:  Numbness     The history is provided by the patient and a relative (relative acted as interpretor).      Jordan Warner is a 57 year old male with history of paroxysmal atrial fibrillation with RVR who presents with 4 days of upper left eye twitching and 2 days of left-sided lip numbness. The patient also states that he has been dizzy recently as well. His dizziness has not made it difficult to walk. He has had one episode of nausea with his dizziness. The patient denies any lip swelling and trouble swallowing as well. The patient, who takes Lisinopril, does note that he has noticed a \"bigger lip\" in the past.    The patient was diagnosed with atrial fibrillation in 2017 and was prescribed Flecainide. He denies any tachycardia or notable abnormal heart rhythm with his onset of eye twitching.    Review of Systems   HENT: Negative for trouble swallowing.         (-) lip swelling   Eyes:        (+) left eye twitching   Gastrointestinal: Positive for nausea.   Neurological: Positive for dizziness and numbness (on left lip).   All other systems reviewed and are negative.    Allergies:  The patient has no known allergies to medications.    Medications:  Aspirin  Atorvastatin  Cholecalciferol  Flecainide  Lisinopril  Methocarbamol  Metoprolol succinate    Past Medical History:     Paroxysmal atrial fibrillation with RVR     Past Surgical History:    The patient denies any prior surgical history.    Family History:    Appendicitis    Social History:  The patient presents to the ED with a family member.   PCP: Patria Duran    Physical Exam     Patient Vitals for the past 24 hrs:   BP Temp Temp src Pulse Resp SpO2   12/11/22 2102 124/68 -- -- 51 -- 98 %   12/11/22 2049 137/75 -- -- -- -- 98 %   12/11/22 1650 (!) 164/78 97.6  F (36.4  C) Temporal 55 17 100 %       Physical Exam  Constitutional: Vital signs reviewed as above.   HENT:    Head: No external signs of trauma noted.   Eyes: Pupils are " equal, round, and reactive to light.   Cardiovascular: Normal rate, regular rhythm, normal heart sounds and intact distal pulses.    Pulmonary/Chest: Effort normal and breath sounds normal. No respiratory distress. No wheezes noted.   Gastrointestinal: Soft. There is no tenderness. There is no rebound.   Musculoskeletal:   No deformities appreciated   No edema noted  Neurological:    Patient is alert and oriented to person, place, and time.    Speech is fluent, cognition is normal.   CN 2-12 intact (PERRL, EOMI, symmetric smile, equal eye squeeze and forehead raise, normal and equal sensation to bilateral forehead/cheek/chin, grossly equal hearing B/L, midline tongue protrusion with nl side-to-side movement, normal shoulder shrug).    RUE strength 5/5: , finger abd, wrist flex/ext, elbow flex/ext.    LUE strength 5/5: , finger abd, wrist flex/ext, elbow flex/ext.    RLE strength 5/5: ankle flex/ext, knee flex/ext, hip flex.    LLE strength 5/5: ankle flex/ext, knee flex/ext, hip flex.    Sensation equal in all 4 extremities.    No arm drift.     Cerebellar: Normal rapid alternating movements     ( finger-nose-finger, rapid pronation/supination, hand rolling)    Normal heel-to-shin   Normal gait as observed in the ED.   Skin: Skin is warm and dry.   Psychiatric: The patient appears calm    Emergency Department Course   ECG  ECG results from 12/11/22   EKG 12 lead     Value    Systolic Blood Pressure     Diastolic Blood Pressure     Ventricular Rate 52    Atrial Rate 52    NH Interval 144    QRS Duration 100        QTc 388    P Axis 44    R AXIS 5    T Axis 36    Interpretation ECG      Sinus bradycardia  Septal infarct , age undetermined  Abnormal ECG  When compared with ECG of 19-AUG-2018 23:43,  No significant change was found  Read at 1723.         Imaging:  MR Brain w/o & w Contrast   Final Result   IMPRESSION:   HEAD MRI:   Normal head MRI.      HEAD MRA:   1.  No aneurysm, high flow AVM or  significant stenosis identified.   2.  Variant [place] anatomy as above.      NECK MRA:   No measurable stenosis or dissection.         MRA Neck (Carotids) wo & w Contrast   Final Result   IMPRESSION:   HEAD MRI:   Normal head MRI.      HEAD MRA:   1.  No aneurysm, high flow AVM or significant stenosis identified.   2.  Variant [place] anatomy as above.      NECK MRA:   No measurable stenosis or dissection.         MRA Brain ([place]) wo Contrast   Final Result   IMPRESSION:   HEAD MRI:   Normal head MRI.      HEAD MRA:   1.  No aneurysm, high flow AVM or significant stenosis identified.   2.  Variant [place] anatomy as above.      NECK MRA:   No measurable stenosis or dissection.         Head CT w/o contrast   Final Result   IMPRESSION:   1.  No acute intracranial process.        Report per radiology    Laboratory:  Labs Ordered and Resulted from Time of ED Arrival to Time of ED Departure   BASIC METABOLIC PANEL - Abnormal       Result Value    Sodium 138      Potassium 4.2      Chloride 103      Carbon Dioxide (CO2) 26      Anion Gap 9      Urea Nitrogen 20.9 (*)     Creatinine 0.90      Calcium 9.1      Glucose 108 (*)     GFR Estimate >90     INR - Normal    INR 0.99     PARTIAL THROMBOPLASTIN TIME - Normal    aPTT 26     TROPONIN T, HIGH SENSITIVITY - Normal    Troponin T, High Sensitivity 9     CBC WITH PLATELETS AND DIFFERENTIAL    WBC Count 6.7      RBC Count 4.88      Hemoglobin 14.9      Hematocrit 45.8      MCV 94      MCH 30.5      MCHC 32.5      RDW 12.1      Platelet Count 196      % Neutrophils 61      % Lymphocytes 26      % Monocytes 10      % Eosinophils 3      % Basophils 0      % Immature Granulocytes 0      NRBCs per 100 WBC 0      Absolute Neutrophils 4.0      Absolute Lymphocytes 1.8      Absolute Monocytes 0.7      Absolute Eosinophils 0.2      Absolute Basophils 0.0      Absolute Immature Granulocytes 0.0      Absolute NRBCs 0.0     GLUCOSE MONITOR NURSING  POCT        Emergency Department Course:     Reviewed:  I reviewed nursing notes, vitals, past medical history and Care Everywhere    Assessments:  2004 I obtained history and examined the patient as noted above.   2224 I rechecked the patient and explained findings. I believe that they are safe for discharge at this time.    Interventions:  2121 Gadavist 10mL IV    Disposition:  The patient was discharged to home.     Impression & Plan     Medical Decision Making:     This 57-year-old male patient presents to the ED due to concerns for eye twitching and lip sensation abnormality in addition to intermittent dizziness/gait unsteadiness.  Please see the HPI and exam for specifics.  Broad differential was considered and given the patient's history of atrial fibrillation for which he takes aspirin and flecainide, stroke is in the differential.  Additionally, the patient takes lisinopril and he does describe some tingling in the left lower lip.  He states that in the past it has occasionally been a little more swollen but that is not usually very much and seems to go away.    Laboratory studies were ordered with results as above.     After discussion with the patient, we elected to order MRI imaging of the brain. Fortunately, no stroke is noted.    At this time, I think the most likely etiology of the patient's left symptoms is probably some degree of transient angioedema.  I think that stopping lisinopril and starting something like amlodipine would not be unreasonable.  I will also encourage close primary care follow-up in the outpatient setting.  He can return at anytime with new or worsening symptoms.  Anticipatory guidance given prior to discharge.    Diagnosis:    ICD-10-CM    1. Paresthesias  R20.2       2. Dizziness  R42           Discharge Medications:  New Prescriptions    AMLODIPINE (NORVASC) 5 MG TABLET    Take 1 tablet (5 mg) by mouth daily for 30 days       Scribe Disclosure:  Lc IVY Hired, am serving as  a scribe at 7:56 PM on 12/11/2022 to document services personally performed by Amador Duggan DO based on my observations and the provider's statements to me.       Amador Duggan DO  12/11/22 0798

## 2022-12-12 NOTE — DISCHARGE INSTRUCTIONS
"Diagnosis: I am uncertain of the exact reason for the eye twitching, but based on your blood pressure medication (lisinopril) I wonder if the sensation you feel on your lip and the fact that you have intermittently felt this for a while could be a potential allergic reaction to that medicine.  What do you do next:   Continue your home medications unless we have specifically changed them  STOP Lisiopril  I have prescribed a blood pressure medication called \"amlodipine\" and sent this to your pharmacy of choice.  I would suggest that you call your clinic in the morning and talk about your symptoms in this visit as well as the medication prescribed and they can change the medication if they wish.  Follow up as indicated below    When do you return: If you have uncontrollable dizziness, focal numbness/weakness of your face/arms/legs, difficulty walking or speaking, or any other symptoms that concern you, please return to the ED for reevaluation.    Thank you for allowing us to care for you today.    "

## 2022-12-12 NOTE — ED PROVIDER NOTES
PIT/Triage Evaluation    Patient presented with numbness left lip and above left eye for the past 2 days.     Exam is notable for no facial asymmetry of swelling.     Appropriate interventions for symptom management were initiated if applicable.  Appropriate diagnostic tests were initiated if indicated.    Important information for subsequent clinician lab and imaging ordered    I briefly evaluated the patient and developed an initial plan of care. I discussed this plan and explained that this brief interaction does not constitute a full evaluation. Patient/family understands that they should wait to be fully evaluated and discuss any test results with another clinician prior to leaving the hospital.       Misty Bridges MD  12/11/22 0392

## (undated) RX ORDER — REGADENOSON 0.08 MG/ML
INJECTION, SOLUTION INTRAVENOUS
Status: DISPENSED
Start: 2017-02-14